# Patient Record
Sex: MALE | Race: WHITE | NOT HISPANIC OR LATINO | Employment: FULL TIME | URBAN - METROPOLITAN AREA
[De-identification: names, ages, dates, MRNs, and addresses within clinical notes are randomized per-mention and may not be internally consistent; named-entity substitution may affect disease eponyms.]

---

## 2017-01-26 ENCOUNTER — TRANSCRIBE ORDERS (OUTPATIENT)
Dept: ADMINISTRATIVE | Facility: HOSPITAL | Age: 52
End: 2017-01-26

## 2017-01-26 ENCOUNTER — LAB (OUTPATIENT)
Dept: LAB | Facility: HOSPITAL | Age: 52
End: 2017-01-26

## 2017-01-26 DIAGNOSIS — Z12.5 SPECIAL SCREENING FOR MALIGNANT NEOPLASM OF PROSTATE: ICD-10-CM

## 2017-01-26 DIAGNOSIS — Z00.00 ROUTINE GENERAL MEDICAL EXAMINATION AT A HEALTH CARE FACILITY: Primary | ICD-10-CM

## 2017-01-26 DIAGNOSIS — Z00.00 ROUTINE GENERAL MEDICAL EXAMINATION AT A HEALTH CARE FACILITY: ICD-10-CM

## 2017-01-26 LAB
ALBUMIN SERPL-MCNC: 4.2 G/DL (ref 3.5–5.2)
ALBUMIN/GLOB SERPL: 1.6 G/DL
ALP SERPL-CCNC: 58 U/L (ref 39–117)
ALT SERPL W P-5'-P-CCNC: 20 U/L (ref 1–41)
ANION GAP SERPL CALCULATED.3IONS-SCNC: 12.6 MMOL/L
AST SERPL-CCNC: 24 U/L (ref 1–40)
BASOPHILS # BLD AUTO: 0.06 10*3/MM3 (ref 0–0.2)
BASOPHILS NFR BLD AUTO: 1.2 % (ref 0–1.5)
BILIRUB SERPL-MCNC: 1.2 MG/DL (ref 0.1–1.2)
BILIRUB UR QL STRIP: NEGATIVE
BUN BLD-MCNC: 15 MG/DL (ref 6–20)
BUN/CREAT SERPL: 15.3 (ref 7–25)
CALCIUM SPEC-SCNC: 9.5 MG/DL (ref 8.6–10.5)
CHLORIDE SERPL-SCNC: 104 MMOL/L (ref 98–107)
CHOLEST SERPL-MCNC: 177 MG/DL (ref 0–200)
CLARITY UR: CLEAR
CO2 SERPL-SCNC: 25.4 MMOL/L (ref 22–29)
COLOR UR: YELLOW
CREAT BLD-MCNC: 0.98 MG/DL (ref 0.76–1.27)
DEPRECATED RDW RBC AUTO: 47.1 FL (ref 37–54)
EOSINOPHIL # BLD AUTO: 0.39 10*3/MM3 (ref 0–0.7)
EOSINOPHIL NFR BLD AUTO: 7.6 % (ref 0.3–6.2)
ERYTHROCYTE [DISTWIDTH] IN BLOOD BY AUTOMATED COUNT: 13.3 % (ref 11.5–14.5)
GFR SERPL CREATININE-BSD FRML MDRD: 80 ML/MIN/1.73
GLOBULIN UR ELPH-MCNC: 2.6 GM/DL
GLUCOSE BLD-MCNC: 101 MG/DL (ref 65–99)
GLUCOSE UR STRIP-MCNC: NEGATIVE MG/DL
HCT VFR BLD AUTO: 48.3 % (ref 40.4–52.2)
HDLC SERPL-MCNC: 56 MG/DL (ref 40–60)
HGB BLD-MCNC: 15.2 G/DL (ref 13.7–17.6)
HGB UR QL STRIP.AUTO: NEGATIVE
IMM GRANULOCYTES # BLD: 0 10*3/MM3 (ref 0–0.03)
IMM GRANULOCYTES NFR BLD: 0 % (ref 0–0.5)
KETONES UR QL STRIP: ABNORMAL
LDLC SERPL CALC-MCNC: 100 MG/DL (ref 0–100)
LDLC/HDLC SERPL: 1.78 {RATIO}
LEUKOCYTE ESTERASE UR QL STRIP.AUTO: NEGATIVE
LYMPHOCYTES # BLD AUTO: 1.02 10*3/MM3 (ref 0.9–4.8)
LYMPHOCYTES NFR BLD AUTO: 20 % (ref 19.6–45.3)
MCH RBC QN AUTO: 30.5 PG (ref 27–32.7)
MCHC RBC AUTO-ENTMCNC: 31.5 G/DL (ref 32.6–36.4)
MCV RBC AUTO: 96.8 FL (ref 79.8–96.2)
MONOCYTES # BLD AUTO: 0.71 10*3/MM3 (ref 0.2–1.2)
MONOCYTES NFR BLD AUTO: 13.9 % (ref 5–12)
NEUTROPHILS # BLD AUTO: 2.93 10*3/MM3 (ref 1.9–8.1)
NEUTROPHILS NFR BLD AUTO: 57.3 % (ref 42.7–76)
NITRITE UR QL STRIP: NEGATIVE
PH UR STRIP.AUTO: 6 [PH] (ref 5–8)
PLATELET # BLD AUTO: 240 10*3/MM3 (ref 140–500)
PMV BLD AUTO: 11.5 FL (ref 6–12)
POTASSIUM BLD-SCNC: 4.4 MMOL/L (ref 3.5–5.2)
PROT SERPL-MCNC: 6.8 G/DL (ref 6–8.5)
PROT UR QL STRIP: NEGATIVE
PSA SERPL-MCNC: 0.87 NG/ML (ref 0–4)
RBC # BLD AUTO: 4.99 10*6/MM3 (ref 4.6–6)
SODIUM BLD-SCNC: 142 MMOL/L (ref 136–145)
SP GR UR STRIP: 1.02 (ref 1–1.03)
TRIGL SERPL-MCNC: 106 MG/DL (ref 0–150)
UROBILINOGEN UR QL STRIP: ABNORMAL
VLDLC SERPL-MCNC: 21.2 MG/DL (ref 5–40)
WBC NRBC COR # BLD: 5.11 10*3/MM3 (ref 4.5–10.7)

## 2017-01-26 PROCEDURE — 81003 URINALYSIS AUTO W/O SCOPE: CPT | Performed by: INTERNAL MEDICINE

## 2017-01-26 PROCEDURE — 85025 COMPLETE CBC W/AUTO DIFF WBC: CPT | Performed by: INTERNAL MEDICINE

## 2017-01-26 PROCEDURE — 80053 COMPREHEN METABOLIC PANEL: CPT | Performed by: INTERNAL MEDICINE

## 2017-01-26 PROCEDURE — 80061 LIPID PANEL: CPT | Performed by: INTERNAL MEDICINE

## 2017-01-26 PROCEDURE — G0103 PSA SCREENING: HCPCS | Performed by: INTERNAL MEDICINE

## 2017-01-26 PROCEDURE — 36415 COLL VENOUS BLD VENIPUNCTURE: CPT

## 2018-02-05 ENCOUNTER — TRANSCRIBE ORDERS (OUTPATIENT)
Dept: ADMINISTRATIVE | Facility: HOSPITAL | Age: 53
End: 2018-02-05

## 2018-02-05 ENCOUNTER — LAB (OUTPATIENT)
Dept: LAB | Facility: HOSPITAL | Age: 53
End: 2018-02-05

## 2018-02-05 DIAGNOSIS — Z00.00 ROUTINE GENERAL MEDICAL EXAMINATION AT A HEALTH CARE FACILITY: ICD-10-CM

## 2018-02-05 DIAGNOSIS — Z12.5 SPECIAL SCREENING FOR MALIGNANT NEOPLASM OF PROSTATE: Primary | ICD-10-CM

## 2018-02-05 DIAGNOSIS — Z12.5 SPECIAL SCREENING FOR MALIGNANT NEOPLASM OF PROSTATE: ICD-10-CM

## 2018-02-05 LAB
ALBUMIN SERPL-MCNC: 4 G/DL (ref 3.5–5.2)
ALBUMIN/GLOB SERPL: 1.3 G/DL
ALP SERPL-CCNC: 59 U/L (ref 39–117)
ALT SERPL W P-5'-P-CCNC: 20 U/L (ref 1–41)
ANION GAP SERPL CALCULATED.3IONS-SCNC: 9.2 MMOL/L
AST SERPL-CCNC: 21 U/L (ref 1–40)
BASOPHILS # BLD AUTO: 0.04 10*3/MM3 (ref 0–0.2)
BASOPHILS NFR BLD AUTO: 0.8 % (ref 0–1.5)
BILIRUB SERPL-MCNC: 0.7 MG/DL (ref 0.1–1.2)
BILIRUB UR QL STRIP: NEGATIVE
BUN BLD-MCNC: 23 MG/DL (ref 6–20)
BUN/CREAT SERPL: 23.2 (ref 7–25)
CALCIUM SPEC-SCNC: 9.7 MG/DL (ref 8.6–10.5)
CHLORIDE SERPL-SCNC: 105 MMOL/L (ref 98–107)
CHOLEST SERPL-MCNC: 209 MG/DL (ref 0–200)
CLARITY UR: CLEAR
CO2 SERPL-SCNC: 27.8 MMOL/L (ref 22–29)
COLOR UR: YELLOW
CREAT BLD-MCNC: 0.99 MG/DL (ref 0.76–1.27)
DEPRECATED RDW RBC AUTO: 43.4 FL (ref 37–54)
EOSINOPHIL # BLD AUTO: 0.39 10*3/MM3 (ref 0–0.7)
EOSINOPHIL NFR BLD AUTO: 7.5 % (ref 0.3–6.2)
ERYTHROCYTE [DISTWIDTH] IN BLOOD BY AUTOMATED COUNT: 12.7 % (ref 11.5–14.5)
GFR SERPL CREATININE-BSD FRML MDRD: 79 ML/MIN/1.73
GLOBULIN UR ELPH-MCNC: 3 GM/DL
GLUCOSE BLD-MCNC: 97 MG/DL (ref 65–99)
GLUCOSE UR STRIP-MCNC: NEGATIVE MG/DL
HCT VFR BLD AUTO: 47.8 % (ref 40.4–52.2)
HDLC SERPL-MCNC: 64 MG/DL (ref 40–60)
HGB BLD-MCNC: 15.5 G/DL (ref 13.7–17.6)
HGB UR QL STRIP.AUTO: NEGATIVE
IMM GRANULOCYTES # BLD: 0 10*3/MM3 (ref 0–0.03)
IMM GRANULOCYTES NFR BLD: 0 % (ref 0–0.5)
KETONES UR QL STRIP: NEGATIVE
LDLC SERPL CALC-MCNC: 125 MG/DL (ref 0–100)
LDLC/HDLC SERPL: 1.95 {RATIO}
LEUKOCYTE ESTERASE UR QL STRIP.AUTO: NEGATIVE
LYMPHOCYTES # BLD AUTO: 1.09 10*3/MM3 (ref 0.9–4.8)
LYMPHOCYTES NFR BLD AUTO: 20.9 % (ref 19.6–45.3)
MCH RBC QN AUTO: 30.8 PG (ref 27–32.7)
MCHC RBC AUTO-ENTMCNC: 32.4 G/DL (ref 32.6–36.4)
MCV RBC AUTO: 95 FL (ref 79.8–96.2)
MONOCYTES # BLD AUTO: 0.73 10*3/MM3 (ref 0.2–1.2)
MONOCYTES NFR BLD AUTO: 14 % (ref 5–12)
NEUTROPHILS # BLD AUTO: 2.97 10*3/MM3 (ref 1.9–8.1)
NEUTROPHILS NFR BLD AUTO: 56.8 % (ref 42.7–76)
NITRITE UR QL STRIP: NEGATIVE
PH UR STRIP.AUTO: 6 [PH] (ref 5–8)
PLATELET # BLD AUTO: 249 10*3/MM3 (ref 140–500)
PMV BLD AUTO: 11.3 FL (ref 6–12)
POTASSIUM BLD-SCNC: 4.8 MMOL/L (ref 3.5–5.2)
PROT SERPL-MCNC: 7 G/DL (ref 6–8.5)
PROT UR QL STRIP: NEGATIVE
PSA SERPL-MCNC: 1.09 NG/ML (ref 0–4)
RBC # BLD AUTO: 5.03 10*6/MM3 (ref 4.6–6)
SODIUM BLD-SCNC: 142 MMOL/L (ref 136–145)
SP GR UR STRIP: 1.02 (ref 1–1.03)
TRIGL SERPL-MCNC: 101 MG/DL (ref 0–150)
UROBILINOGEN UR QL STRIP: NORMAL
VLDLC SERPL-MCNC: 20.2 MG/DL (ref 5–40)
WBC NRBC COR # BLD: 5.22 10*3/MM3 (ref 4.5–10.7)

## 2018-02-05 PROCEDURE — 80053 COMPREHEN METABOLIC PANEL: CPT | Performed by: INTERNAL MEDICINE

## 2018-02-05 PROCEDURE — 85025 COMPLETE CBC W/AUTO DIFF WBC: CPT | Performed by: INTERNAL MEDICINE

## 2018-02-05 PROCEDURE — 80061 LIPID PANEL: CPT | Performed by: INTERNAL MEDICINE

## 2018-02-05 PROCEDURE — 36415 COLL VENOUS BLD VENIPUNCTURE: CPT

## 2018-02-05 PROCEDURE — 81003 URINALYSIS AUTO W/O SCOPE: CPT | Performed by: INTERNAL MEDICINE

## 2018-02-05 PROCEDURE — G0103 PSA SCREENING: HCPCS | Performed by: INTERNAL MEDICINE

## 2018-05-02 ENCOUNTER — LAB (OUTPATIENT)
Dept: LAB | Facility: HOSPITAL | Age: 53
End: 2018-05-02

## 2018-05-02 ENCOUNTER — TRANSCRIBE ORDERS (OUTPATIENT)
Dept: ADMINISTRATIVE | Facility: HOSPITAL | Age: 53
End: 2018-05-02

## 2018-05-02 DIAGNOSIS — Z20.5 EXPOSURE TO HEPATITIS: Primary | ICD-10-CM

## 2018-05-02 DIAGNOSIS — Z20.5 EXPOSURE TO HEPATITIS: ICD-10-CM

## 2018-05-02 PROCEDURE — 86708 HEPATITIS A ANTIBODY: CPT | Performed by: INTERNAL MEDICINE

## 2018-05-02 PROCEDURE — 36415 COLL VENOUS BLD VENIPUNCTURE: CPT

## 2018-05-03 LAB — HAV AB SER QL IA: NEGATIVE

## 2019-02-20 ENCOUNTER — LAB (OUTPATIENT)
Dept: LAB | Facility: HOSPITAL | Age: 54
End: 2019-02-20

## 2019-02-20 ENCOUNTER — TRANSCRIBE ORDERS (OUTPATIENT)
Dept: ADMINISTRATIVE | Facility: HOSPITAL | Age: 54
End: 2019-02-20

## 2019-02-20 DIAGNOSIS — Z00.00 ROUTINE GENERAL MEDICAL EXAMINATION AT A HEALTH CARE FACILITY: ICD-10-CM

## 2019-02-20 DIAGNOSIS — Z12.5 SPECIAL SCREENING FOR MALIGNANT NEOPLASM OF PROSTATE: ICD-10-CM

## 2019-02-20 DIAGNOSIS — Z00.00 ROUTINE GENERAL MEDICAL EXAMINATION AT A HEALTH CARE FACILITY: Primary | ICD-10-CM

## 2019-02-20 LAB
ALBUMIN SERPL-MCNC: 4.1 G/DL (ref 3.5–5.2)
ALBUMIN/GLOB SERPL: 1.5 G/DL
ALP SERPL-CCNC: 62 U/L (ref 39–117)
ALT SERPL W P-5'-P-CCNC: 26 U/L (ref 1–41)
ANION GAP SERPL CALCULATED.3IONS-SCNC: 12.2 MMOL/L
AST SERPL-CCNC: 21 U/L (ref 1–40)
BASOPHILS # BLD AUTO: 0.05 10*3/MM3 (ref 0–0.2)
BASOPHILS NFR BLD AUTO: 0.8 % (ref 0–1.5)
BILIRUB SERPL-MCNC: 0.8 MG/DL (ref 0.1–1.2)
BILIRUB UR QL STRIP: NEGATIVE
BUN BLD-MCNC: 16 MG/DL (ref 6–20)
BUN/CREAT SERPL: 16.7 (ref 7–25)
CALCIUM SPEC-SCNC: 9.5 MG/DL (ref 8.6–10.5)
CHLORIDE SERPL-SCNC: 105 MMOL/L (ref 98–107)
CHOLEST SERPL-MCNC: 214 MG/DL (ref 0–200)
CLARITY UR: CLEAR
CO2 SERPL-SCNC: 26.8 MMOL/L (ref 22–29)
COLOR UR: YELLOW
CREAT BLD-MCNC: 0.96 MG/DL (ref 0.76–1.27)
DEPRECATED RDW RBC AUTO: 44 FL (ref 37–54)
EOSINOPHIL # BLD AUTO: 0.39 10*3/MM3 (ref 0–0.4)
EOSINOPHIL NFR BLD AUTO: 6.5 % (ref 0.3–6.2)
ERYTHROCYTE [DISTWIDTH] IN BLOOD BY AUTOMATED COUNT: 12.7 % (ref 12.3–15.4)
GFR SERPL CREATININE-BSD FRML MDRD: 82 ML/MIN/1.73
GLOBULIN UR ELPH-MCNC: 2.7 GM/DL
GLUCOSE BLD-MCNC: 95 MG/DL (ref 65–99)
GLUCOSE UR STRIP-MCNC: NEGATIVE MG/DL
HCT VFR BLD AUTO: 51.2 % (ref 37.5–51)
HDLC SERPL-MCNC: 62 MG/DL (ref 40–60)
HGB BLD-MCNC: 16.4 G/DL (ref 13–17.7)
HGB UR QL STRIP.AUTO: NEGATIVE
IMM GRANULOCYTES # BLD AUTO: 0.02 10*3/MM3 (ref 0–0.05)
IMM GRANULOCYTES NFR BLD AUTO: 0.3 % (ref 0–0.5)
KETONES UR QL STRIP: NEGATIVE
LDLC SERPL CALC-MCNC: 133 MG/DL (ref 0–100)
LDLC/HDLC SERPL: 2.15 {RATIO}
LEUKOCYTE ESTERASE UR QL STRIP.AUTO: NEGATIVE
LYMPHOCYTES # BLD AUTO: 1.06 10*3/MM3 (ref 0.7–3.1)
LYMPHOCYTES NFR BLD AUTO: 17.7 % (ref 19.6–45.3)
MCH RBC QN AUTO: 30.3 PG (ref 26.6–33)
MCHC RBC AUTO-ENTMCNC: 32 G/DL (ref 31.5–35.7)
MCV RBC AUTO: 94.6 FL (ref 79–97)
MONOCYTES # BLD AUTO: 0.75 10*3/MM3 (ref 0.1–0.9)
MONOCYTES NFR BLD AUTO: 12.5 % (ref 5–12)
NEUTROPHILS # BLD AUTO: 3.72 10*3/MM3 (ref 1.4–7)
NEUTROPHILS NFR BLD AUTO: 62.2 % (ref 42.7–76)
NITRITE UR QL STRIP: NEGATIVE
NRBC BLD AUTO-RTO: 0 /100 WBC (ref 0–0)
PH UR STRIP.AUTO: 5.5 [PH] (ref 5–8)
PLATELET # BLD AUTO: 269 10*3/MM3 (ref 140–450)
PMV BLD AUTO: 11.2 FL (ref 6–12)
POTASSIUM BLD-SCNC: 4.7 MMOL/L (ref 3.5–5.2)
PROT SERPL-MCNC: 6.8 G/DL (ref 6–8.5)
PROT UR QL STRIP: NEGATIVE
PSA SERPL-MCNC: 1.12 NG/ML (ref 0–4)
RBC # BLD AUTO: 5.41 10*6/MM3 (ref 4.14–5.8)
SODIUM BLD-SCNC: 144 MMOL/L (ref 136–145)
SP GR UR STRIP: 1.02 (ref 1–1.03)
TRIGL SERPL-MCNC: 94 MG/DL (ref 0–150)
UROBILINOGEN UR QL STRIP: NORMAL
VLDLC SERPL-MCNC: 18.8 MG/DL (ref 5–40)
WBC NRBC COR # BLD: 5.99 10*3/MM3 (ref 3.4–10.8)

## 2019-02-20 PROCEDURE — 80061 LIPID PANEL: CPT | Performed by: INTERNAL MEDICINE

## 2019-02-20 PROCEDURE — 80053 COMPREHEN METABOLIC PANEL: CPT | Performed by: INTERNAL MEDICINE

## 2019-02-20 PROCEDURE — 85025 COMPLETE CBC W/AUTO DIFF WBC: CPT | Performed by: INTERNAL MEDICINE

## 2019-02-20 PROCEDURE — 36415 COLL VENOUS BLD VENIPUNCTURE: CPT

## 2019-02-20 PROCEDURE — 81003 URINALYSIS AUTO W/O SCOPE: CPT | Performed by: INTERNAL MEDICINE

## 2019-02-20 PROCEDURE — G0103 PSA SCREENING: HCPCS | Performed by: INTERNAL MEDICINE

## 2019-04-24 ENCOUNTER — OFFICE VISIT (OUTPATIENT)
Dept: SLEEP MEDICINE | Facility: HOSPITAL | Age: 54
End: 2019-04-24

## 2019-04-24 VITALS
OXYGEN SATURATION: 94 % | WEIGHT: 261.6 LBS | SYSTOLIC BLOOD PRESSURE: 122 MMHG | HEIGHT: 72 IN | DIASTOLIC BLOOD PRESSURE: 82 MMHG | HEART RATE: 74 BPM | BODY MASS INDEX: 35.43 KG/M2

## 2019-04-24 DIAGNOSIS — G47.33 OSA (OBSTRUCTIVE SLEEP APNEA): Primary | ICD-10-CM

## 2019-04-24 PROCEDURE — G0463 HOSPITAL OUTPT CLINIC VISIT: HCPCS

## 2019-04-24 PROCEDURE — 99244 OFF/OP CNSLTJ NEW/EST MOD 40: CPT | Performed by: INTERNAL MEDICINE

## 2019-04-26 ENCOUNTER — HOSPITAL ENCOUNTER (OUTPATIENT)
Dept: SLEEP MEDICINE | Facility: HOSPITAL | Age: 54
Discharge: HOME | End: 2019-04-26
Admitting: INTERNAL MEDICINE

## 2019-04-26 DIAGNOSIS — G47.33 OSA (OBSTRUCTIVE SLEEP APNEA): ICD-10-CM

## 2019-04-26 PROCEDURE — 95806 SLEEP STUDY UNATT&RESP EFFT: CPT

## 2019-04-26 PROCEDURE — 95806 SLEEP STUDY UNATT&RESP EFFT: CPT | Performed by: INTERNAL MEDICINE

## 2019-05-10 ENCOUNTER — TELEPHONE (OUTPATIENT)
Dept: SLEEP MEDICINE | Facility: HOSPITAL | Age: 54
End: 2019-05-10

## 2019-05-10 NOTE — TELEPHONE ENCOUNTER
Tech called pts mobile #, no answer. Left vm informing pt of positive study results. Informed sent setup to [facility]ME [facility] and informed of scheduled F/U appt. Mailed results letter. MAB

## 2019-07-31 ENCOUNTER — APPOINTMENT (OUTPATIENT)
Dept: SLEEP MEDICINE | Facility: HOSPITAL | Age: 54
End: 2019-07-31

## 2020-02-21 ENCOUNTER — LAB (OUTPATIENT)
Dept: LAB | Facility: HOSPITAL | Age: 55
End: 2020-02-21

## 2020-02-21 ENCOUNTER — TRANSCRIBE ORDERS (OUTPATIENT)
Dept: ADMINISTRATIVE | Facility: HOSPITAL | Age: 55
End: 2020-02-21

## 2020-02-21 DIAGNOSIS — Z12.5 SPECIAL SCREENING FOR MALIGNANT NEOPLASM OF PROSTATE: ICD-10-CM

## 2020-02-21 DIAGNOSIS — Z00.00 ROUTINE GENERAL MEDICAL EXAMINATION AT A HEALTH CARE FACILITY: ICD-10-CM

## 2020-02-21 DIAGNOSIS — Z00.00 ROUTINE GENERAL MEDICAL EXAMINATION AT A HEALTH CARE FACILITY: Primary | ICD-10-CM

## 2020-02-21 LAB
ALBUMIN SERPL-MCNC: 3.9 G/DL (ref 3.5–5.2)
ALBUMIN/GLOB SERPL: 1.6 G/DL
ALP SERPL-CCNC: 65 U/L (ref 39–117)
ALT SERPL W P-5'-P-CCNC: 21 U/L (ref 1–41)
ANION GAP SERPL CALCULATED.3IONS-SCNC: 9.3 MMOL/L (ref 5–15)
AST SERPL-CCNC: 23 U/L (ref 1–40)
BASOPHILS # BLD AUTO: 0.05 10*3/MM3 (ref 0–0.2)
BASOPHILS NFR BLD AUTO: 0.9 % (ref 0–1.5)
BILIRUB SERPL-MCNC: 0.7 MG/DL (ref 0.2–1.2)
BILIRUB UR QL STRIP: NEGATIVE
BUN BLD-MCNC: 19 MG/DL (ref 6–20)
BUN/CREAT SERPL: 26.8 (ref 7–25)
CALCIUM SPEC-SCNC: 9.1 MG/DL (ref 8.6–10.5)
CHLORIDE SERPL-SCNC: 103 MMOL/L (ref 98–107)
CHOLEST SERPL-MCNC: 212 MG/DL (ref 0–200)
CLARITY UR: CLEAR
CO2 SERPL-SCNC: 26.7 MMOL/L (ref 22–29)
COLOR UR: YELLOW
CREAT BLD-MCNC: 0.71 MG/DL (ref 0.76–1.27)
DEPRECATED RDW RBC AUTO: 43.3 FL (ref 37–54)
EOSINOPHIL # BLD AUTO: 0.54 10*3/MM3 (ref 0–0.4)
EOSINOPHIL NFR BLD AUTO: 9.3 % (ref 0.3–6.2)
ERYTHROCYTE [DISTWIDTH] IN BLOOD BY AUTOMATED COUNT: 12.5 % (ref 12.3–15.4)
GFR SERPL CREATININE-BSD FRML MDRD: 115 ML/MIN/1.73
GLOBULIN UR ELPH-MCNC: 2.4 GM/DL
GLUCOSE BLD-MCNC: 111 MG/DL (ref 65–99)
GLUCOSE UR STRIP-MCNC: NEGATIVE MG/DL
HCT VFR BLD AUTO: 47.1 % (ref 37.5–51)
HDLC SERPL-MCNC: 55 MG/DL (ref 40–60)
HGB BLD-MCNC: 15.5 G/DL (ref 13–17.7)
HGB UR QL STRIP.AUTO: NEGATIVE
IMM GRANULOCYTES # BLD AUTO: 0.02 10*3/MM3 (ref 0–0.05)
IMM GRANULOCYTES NFR BLD AUTO: 0.3 % (ref 0–0.5)
KETONES UR QL STRIP: NEGATIVE
LDLC SERPL CALC-MCNC: 133 MG/DL (ref 0–100)
LDLC/HDLC SERPL: 2.42 {RATIO}
LEUKOCYTE ESTERASE UR QL STRIP.AUTO: NEGATIVE
LYMPHOCYTES # BLD AUTO: 1 10*3/MM3 (ref 0.7–3.1)
LYMPHOCYTES NFR BLD AUTO: 17.3 % (ref 19.6–45.3)
MCH RBC QN AUTO: 30.4 PG (ref 26.6–33)
MCHC RBC AUTO-ENTMCNC: 32.9 G/DL (ref 31.5–35.7)
MCV RBC AUTO: 92.4 FL (ref 79–97)
MONOCYTES # BLD AUTO: 0.81 10*3/MM3 (ref 0.1–0.9)
MONOCYTES NFR BLD AUTO: 14 % (ref 5–12)
NEUTROPHILS # BLD AUTO: 3.36 10*3/MM3 (ref 1.7–7)
NEUTROPHILS NFR BLD AUTO: 58.2 % (ref 42.7–76)
NITRITE UR QL STRIP: NEGATIVE
NRBC BLD AUTO-RTO: 0 /100 WBC (ref 0–0.2)
PH UR STRIP.AUTO: 6.5 [PH] (ref 5–8)
PLATELET # BLD AUTO: 254 10*3/MM3 (ref 140–450)
PMV BLD AUTO: 11.2 FL (ref 6–12)
POTASSIUM BLD-SCNC: 4.3 MMOL/L (ref 3.5–5.2)
PROT SERPL-MCNC: 6.3 G/DL (ref 6–8.5)
PROT UR QL STRIP: NEGATIVE
PSA SERPL-MCNC: 1.11 NG/ML (ref 0–4)
RBC # BLD AUTO: 5.1 10*6/MM3 (ref 4.14–5.8)
SODIUM BLD-SCNC: 139 MMOL/L (ref 136–145)
SP GR UR STRIP: 1.02 (ref 1–1.03)
TRIGL SERPL-MCNC: 120 MG/DL (ref 0–150)
UROBILINOGEN UR QL STRIP: NORMAL
VLDLC SERPL-MCNC: 24 MG/DL (ref 5–40)
WBC NRBC COR # BLD: 5.78 10*3/MM3 (ref 3.4–10.8)

## 2020-02-21 PROCEDURE — 81003 URINALYSIS AUTO W/O SCOPE: CPT | Performed by: INTERNAL MEDICINE

## 2020-02-21 PROCEDURE — 85025 COMPLETE CBC W/AUTO DIFF WBC: CPT | Performed by: INTERNAL MEDICINE

## 2020-02-21 PROCEDURE — 36415 COLL VENOUS BLD VENIPUNCTURE: CPT

## 2020-02-21 PROCEDURE — 80053 COMPREHEN METABOLIC PANEL: CPT | Performed by: INTERNAL MEDICINE

## 2020-02-21 PROCEDURE — G0103 PSA SCREENING: HCPCS | Performed by: INTERNAL MEDICINE

## 2020-02-21 PROCEDURE — 80061 LIPID PANEL: CPT | Performed by: INTERNAL MEDICINE

## 2021-02-25 ENCOUNTER — TRANSCRIBE ORDERS (OUTPATIENT)
Dept: ADMINISTRATIVE | Facility: HOSPITAL | Age: 56
End: 2021-02-25

## 2021-02-25 ENCOUNTER — LAB (OUTPATIENT)
Dept: LAB | Facility: HOSPITAL | Age: 56
End: 2021-02-25

## 2021-02-25 DIAGNOSIS — Z20.828 EXPOSURE TO SARS-ASSOCIATED CORONAVIRUS: ICD-10-CM

## 2021-02-25 DIAGNOSIS — Z12.5 SPECIAL SCREENING FOR MALIGNANT NEOPLASM OF PROSTATE: ICD-10-CM

## 2021-02-25 DIAGNOSIS — Z00.00 ROUTINE GENERAL MEDICAL EXAMINATION AT A HEALTH CARE FACILITY: ICD-10-CM

## 2021-02-25 DIAGNOSIS — Z00.00 ROUTINE GENERAL MEDICAL EXAMINATION AT A HEALTH CARE FACILITY: Primary | ICD-10-CM

## 2021-02-25 LAB
ALBUMIN SERPL-MCNC: 4.3 G/DL (ref 3.5–5.2)
ALBUMIN/GLOB SERPL: 1.7 G/DL
ALP SERPL-CCNC: 72 U/L (ref 39–117)
ALT SERPL W P-5'-P-CCNC: 28 U/L (ref 1–41)
ANION GAP SERPL CALCULATED.3IONS-SCNC: 8.8 MMOL/L (ref 5–15)
AST SERPL-CCNC: 34 U/L (ref 1–40)
BASOPHILS # BLD AUTO: 0.06 10*3/MM3 (ref 0–0.2)
BASOPHILS NFR BLD AUTO: 1.1 % (ref 0–1.5)
BILIRUB SERPL-MCNC: 1.3 MG/DL (ref 0–1.2)
BILIRUB UR QL STRIP: NEGATIVE
BUN SERPL-MCNC: 15 MG/DL (ref 6–20)
BUN/CREAT SERPL: 20.5 (ref 7–25)
CALCIUM SPEC-SCNC: 8.9 MG/DL (ref 8.6–10.5)
CHLORIDE SERPL-SCNC: 101 MMOL/L (ref 98–107)
CHOLEST SERPL-MCNC: 210 MG/DL (ref 0–200)
CLARITY UR: CLEAR
CO2 SERPL-SCNC: 26.2 MMOL/L (ref 22–29)
COLOR UR: YELLOW
CREAT SERPL-MCNC: 0.73 MG/DL (ref 0.76–1.27)
DEPRECATED RDW RBC AUTO: 40 FL (ref 37–54)
EOSINOPHIL # BLD AUTO: 0.44 10*3/MM3 (ref 0–0.4)
EOSINOPHIL NFR BLD AUTO: 7.8 % (ref 0.3–6.2)
ERYTHROCYTE [DISTWIDTH] IN BLOOD BY AUTOMATED COUNT: 12.2 % (ref 12.3–15.4)
GFR SERPL CREATININE-BSD FRML MDRD: 111 ML/MIN/1.73
GLOBULIN UR ELPH-MCNC: 2.5 GM/DL
GLUCOSE SERPL-MCNC: 97 MG/DL (ref 65–99)
GLUCOSE UR STRIP-MCNC: NEGATIVE MG/DL
HCT VFR BLD AUTO: 47.7 % (ref 37.5–51)
HDLC SERPL-MCNC: 55 MG/DL (ref 40–60)
HGB BLD-MCNC: 16.7 G/DL (ref 13–17.7)
HGB UR QL STRIP.AUTO: NEGATIVE
IMM GRANULOCYTES # BLD AUTO: 0.01 10*3/MM3 (ref 0–0.05)
IMM GRANULOCYTES NFR BLD AUTO: 0.2 % (ref 0–0.5)
KETONES UR QL STRIP: ABNORMAL
LDLC SERPL CALC-MCNC: 139 MG/DL (ref 0–100)
LDLC/HDLC SERPL: 2.5 {RATIO}
LEUKOCYTE ESTERASE UR QL STRIP.AUTO: NEGATIVE
LYMPHOCYTES # BLD AUTO: 1.12 10*3/MM3 (ref 0.7–3.1)
LYMPHOCYTES NFR BLD AUTO: 19.8 % (ref 19.6–45.3)
MCH RBC QN AUTO: 31.5 PG (ref 26.6–33)
MCHC RBC AUTO-ENTMCNC: 35 G/DL (ref 31.5–35.7)
MCV RBC AUTO: 90 FL (ref 79–97)
MONOCYTES # BLD AUTO: 0.87 10*3/MM3 (ref 0.1–0.9)
MONOCYTES NFR BLD AUTO: 15.3 % (ref 5–12)
NEUTROPHILS NFR BLD AUTO: 3.17 10*3/MM3 (ref 1.7–7)
NEUTROPHILS NFR BLD AUTO: 55.8 % (ref 42.7–76)
NITRITE UR QL STRIP: NEGATIVE
NRBC BLD AUTO-RTO: 0 /100 WBC (ref 0–0.2)
PH UR STRIP.AUTO: 5.5 [PH] (ref 5–8)
PLATELET # BLD AUTO: 262 10*3/MM3 (ref 140–450)
PMV BLD AUTO: 11 FL (ref 6–12)
POTASSIUM SERPL-SCNC: 4.3 MMOL/L (ref 3.5–5.2)
PROT SERPL-MCNC: 6.8 G/DL (ref 6–8.5)
PROT UR QL STRIP: NEGATIVE
PSA SERPL-MCNC: 1 NG/ML (ref 0–4)
RBC # BLD AUTO: 5.3 10*6/MM3 (ref 4.14–5.8)
SODIUM SERPL-SCNC: 136 MMOL/L (ref 136–145)
SP GR UR STRIP: 1.01 (ref 1–1.03)
TRIGL SERPL-MCNC: 88 MG/DL (ref 0–150)
UROBILINOGEN UR QL STRIP: ABNORMAL
VLDLC SERPL-MCNC: 16 MG/DL (ref 5–40)
WBC # BLD AUTO: 5.67 10*3/MM3 (ref 3.4–10.8)

## 2021-02-25 PROCEDURE — 80061 LIPID PANEL: CPT

## 2021-02-25 PROCEDURE — G0103 PSA SCREENING: HCPCS

## 2021-02-25 PROCEDURE — 86769 SARS-COV-2 COVID-19 ANTIBODY: CPT

## 2021-02-25 PROCEDURE — 85025 COMPLETE CBC W/AUTO DIFF WBC: CPT

## 2021-02-25 PROCEDURE — 81003 URINALYSIS AUTO W/O SCOPE: CPT

## 2021-02-25 PROCEDURE — 80053 COMPREHEN METABOLIC PANEL: CPT

## 2021-02-25 PROCEDURE — 36415 COLL VENOUS BLD VENIPUNCTURE: CPT

## 2021-02-26 LAB — SARS-COV-2 AB SERPL QL IA: NEGATIVE

## 2021-05-25 ENCOUNTER — TRANSCRIBE ORDERS (OUTPATIENT)
Dept: ADMINISTRATIVE | Facility: HOSPITAL | Age: 56
End: 2021-05-25

## 2021-05-25 ENCOUNTER — LAB (OUTPATIENT)
Dept: LAB | Facility: HOSPITAL | Age: 56
End: 2021-05-25

## 2021-05-25 DIAGNOSIS — E78.5 HYPERLIPIDEMIA, UNSPECIFIED HYPERLIPIDEMIA TYPE: Primary | ICD-10-CM

## 2021-05-25 DIAGNOSIS — E78.5 HYPERLIPIDEMIA, UNSPECIFIED HYPERLIPIDEMIA TYPE: ICD-10-CM

## 2021-05-25 LAB
ALBUMIN SERPL-MCNC: 3.9 G/DL (ref 3.5–5.2)
ALBUMIN/GLOB SERPL: 1.8 G/DL
ALP SERPL-CCNC: 74 U/L (ref 39–117)
ALT SERPL W P-5'-P-CCNC: 21 U/L (ref 1–41)
ANION GAP SERPL CALCULATED.3IONS-SCNC: 8.8 MMOL/L (ref 5–15)
AST SERPL-CCNC: 21 U/L (ref 1–40)
BILIRUB SERPL-MCNC: 1 MG/DL (ref 0–1.2)
BUN SERPL-MCNC: 18 MG/DL (ref 6–20)
BUN/CREAT SERPL: 25.4 (ref 7–25)
CALCIUM SPEC-SCNC: 9 MG/DL (ref 8.6–10.5)
CHLORIDE SERPL-SCNC: 107 MMOL/L (ref 98–107)
CHOLEST SERPL-MCNC: 115 MG/DL (ref 0–200)
CK SERPL-CCNC: 149 U/L (ref 20–200)
CO2 SERPL-SCNC: 25.2 MMOL/L (ref 22–29)
CREAT SERPL-MCNC: 0.71 MG/DL (ref 0.76–1.27)
GFR SERPL CREATININE-BSD FRML MDRD: 115 ML/MIN/1.73
GLOBULIN UR ELPH-MCNC: 2.2 GM/DL
GLUCOSE SERPL-MCNC: 98 MG/DL (ref 65–99)
HDLC SERPL-MCNC: 55 MG/DL (ref 40–60)
LDLC SERPL CALC-MCNC: 46 MG/DL (ref 0–100)
LDLC/HDLC SERPL: 0.86 {RATIO}
POTASSIUM SERPL-SCNC: 4.4 MMOL/L (ref 3.5–5.2)
PROT SERPL-MCNC: 6.1 G/DL (ref 6–8.5)
SODIUM SERPL-SCNC: 141 MMOL/L (ref 136–145)
TRIGL SERPL-MCNC: 63 MG/DL (ref 0–150)
VLDLC SERPL-MCNC: 14 MG/DL (ref 5–40)

## 2021-05-25 PROCEDURE — 36415 COLL VENOUS BLD VENIPUNCTURE: CPT

## 2021-05-25 PROCEDURE — 82550 ASSAY OF CK (CPK): CPT

## 2021-05-25 PROCEDURE — 80061 LIPID PANEL: CPT

## 2021-05-25 PROCEDURE — 80053 COMPREHEN METABOLIC PANEL: CPT

## 2022-10-03 ENCOUNTER — OFFICE VISIT (OUTPATIENT)
Dept: SLEEP MEDICINE | Facility: HOSPITAL | Age: 57
End: 2022-10-03

## 2022-10-03 VITALS
BODY MASS INDEX: 36.84 KG/M2 | DIASTOLIC BLOOD PRESSURE: 81 MMHG | HEIGHT: 72 IN | WEIGHT: 272 LBS | SYSTOLIC BLOOD PRESSURE: 140 MMHG | OXYGEN SATURATION: 99 % | HEART RATE: 58 BPM

## 2022-10-03 DIAGNOSIS — E66.9 OBESITY (BMI 30-39.9): ICD-10-CM

## 2022-10-03 DIAGNOSIS — G47.33 OSA (OBSTRUCTIVE SLEEP APNEA): Primary | ICD-10-CM

## 2022-10-03 PROCEDURE — G0463 HOSPITAL OUTPT CLINIC VISIT: HCPCS

## 2022-10-03 NOTE — PROGRESS NOTES
"[facility]  Telephone: [phone] / Fax: [phone] [place]  Telephone: [phone] / Fax: [phone] [place]    Referring Physician: Jorge Jung MD  PCP: Jorge Jung MD    Reason for consult:  sleep apnea    Robin Engle is a 56 y.o.male  was seen in the Sleep Disorders Center today for evaluation of sleep apnea.  He has history of BEKA dating back to 2003 or 2004.   He had T&A and UPPP in the past that improved symptoms to some degree initially. He then had his sleep study repeated in 2019. Study showed moderate BEKA. He was prescribed CPAP. However, he was not ready to start CPAP at this time. Since then he gained 30 lbs. Snoring has increased. He returns for worsening snoring as it is disturbing to his wife. He reports abnormal leg movements/twitching for 30 min at night.    SH- , drinks 5 alc per week, 4-5 caffeine per day    ROS-negative.    Robin Engle  has a past medical history of BEKA (obstructive sleep apnea) (2003).    Current Medications:    Current Outpatient Medications:   •  Ped Multivitamins-Fl-Iron (MULTIVITAMIN WITH FLUORIDE/IRON) 0.25-10 MG/ML solution solution, Take  by mouth Daily., Disp: , Rfl:     I have reviewed Past Medical History, Past Surgical History, Medication List, Social History and Family History as entered in Sleep Questionnaire and EPIC.    ESS  4   Vital Signs /81   Pulse 58   Ht 182.9 cm (72.01\")   Wt 123 kg (272 lb)   SpO2 99%   BMI 36.88 kg/m²  Body mass index is 36.88 kg/m².    General Alert and oriented. No acute distress noted   Pharynx/Throat Class IV  Mallampati airway, large tongue, no evidence of redundant lateral pharyngeal tissue. No oral lesions. No thrush. Moist mucous membranes.   Head Normocephalic. Symmetrical. Atraumatic.    Nose No septal deviation. No drainage   Chest Wall Normal shape. Symmetric expansion with respiration. No tenderness.   Neck Trachea midline, no thyromegaly " or adenopathy    Lungs Clear to auscultation bilaterally. No wheezes. No rhonchi. No rales. Respirations regular, even and unlabored.   Heart Regular rhythm and normal rate. Normal S1 and S2. No murmur   Abdomen Soft, non-tender and non-distended. Normal bowel sounds. No masses.   Extremities Moves all extremities well. No edema   Psychiatric Normal mood and affect.        Impression:  1. BEKA (obstructive sleep apnea)    2. Obesity (BMI 30-39.9)          Plan:  Order CPAP machine based on 2019 study. I don't feel that repeat study is indicated at this time as he gained weight, has more snoring, and more sleep disturbances. I discussed CPAP, OMAD, and inspire treatment options. Plan is to order auto CPAP 5-20cm H2O based on 2019 study. If he notices intolerance, we can plan on doing in lab titration after he used the machine for about 30 days. I reviewed original sleep study report.      I appreciate the opportunity to participate in this patient's care.      GISELA Nicolas  [facility]  Phone: [phone]      Part of this note may be an electronic transcription/translation of spoken language to printed text using the Dragon Dictation System. Some errors may exist even though the document was edited.

## 2022-10-21 ENCOUNTER — OFFICE VISIT (OUTPATIENT)
Dept: SLEEP MEDICINE | Facility: HOSPITAL | Age: 57
End: 2022-10-21

## 2022-10-21 VITALS
HEIGHT: 72 IN | SYSTOLIC BLOOD PRESSURE: 126 MMHG | OXYGEN SATURATION: 97 % | BODY MASS INDEX: 35.49 KG/M2 | DIASTOLIC BLOOD PRESSURE: 81 MMHG | HEART RATE: 66 BPM | WEIGHT: 262 LBS

## 2022-10-21 DIAGNOSIS — G47.33 OSA (OBSTRUCTIVE SLEEP APNEA): Primary | ICD-10-CM

## 2022-10-21 PROCEDURE — G0463 HOSPITAL OUTPT CLINIC VISIT: HCPCS

## 2022-10-21 NOTE — PROGRESS NOTES
"[facility]  Telephone: [phone] / Fax: [phone] [place]  Telephone: [phone] / Fax: [phone] Karolina Reynolds    PCP: Jorge Jung MD    Reason for visit: BEKA f/u    Robin Engle is a 56 y.o.male  was last seen at [facility] sleep lab earlier this month. Insurance did not want to pay for new device based on 2019 study. He is here to arrange a new study. I reviewed prior history and clinical presentation. No change since 10/3/22.    He has history of BEKA dating back to 2003 or 2004.   He had T&A and UPPP in the past that improved symptoms to some degree initially. He then had his sleep study repeated in 2019. Study showed moderate BEKA. He was prescribed CPAP. However, he was not ready to start CPAP at that time. Since then he gained 30 lbs. Snoring has increased.  His sleep schedule is 11:30pm-7am. ESS is 10.    SH-no tobacco,  2-3 alc per week, 4-5 caffeine per day      Current Medications:    Current Outpatient Medications:   •  Ped Multivitamins-Fl-Iron (MULTIVITAMIN WITH FLUORIDE/IRON) 0.25-10 MG/ML solution solution, Take  by mouth Daily., Disp: , Rfl:    also entered in Sleep Questionnaire    Patient  has a past medical history of BEKA (obstructive sleep apnea) (2003).    I have reviewed the Past Medical History, Past Surgical History, Social History and Family History.    Vital Signs /81   Pulse 66   Ht 182.9 cm (72.01\")   Wt 119 kg (262 lb)   SpO2 97%   BMI 35.52 kg/m²  Body mass index is 35.52 kg/m².    General Alert and oriented. No acute distress noted   Pharynx/Throat Class IV  Mallampati airway, large tongue, no evidence of redundant lateral pharyngeal tissue. No oral lesions. No thrush. Moist mucous membranes.   Head Normocephalic. Symmetrical. Atraumatic.    Nose No septal deviation. No drainage   Chest Wall Normal shape. Symmetric expansion with respiration. No tenderness.   Neck Trachea midline, no thyromegaly or adenopathy    Lungs Clear to " auscultation bilaterally. No wheezes. No rhonchi. No rales. Respirations regular, even and unlabored.   Heart Regular rhythm and normal rate. Normal S1 and S2. No murmur   Abdomen Soft, non-tender and non-distended. Normal bowel sounds. No masses.   Extremities Moves all extremities well. No edema   Psychiatric Normal mood and affect.       HST 2019-Home Sleep Study Findings:   Recording start time 11:39 PM and recording end time 7:47 AM. Total recording time 488 minutes and monitoring time 456 minutes.  The patient had 28 obstructive events and 133 partial obstructive events. AHI for total sleep time is moderately abnormal at 21 events per hour. Lowest oxygen saturation is 76% and sleep-related hypoxia present for 30 minutes.    Impression:  1. BEKA (obstructive sleep apnea)          Plan:  Schedule updated HST to re-evaluate BEKA severity in view of weight gain and snoring.  Insurance did not approve coverage for a CPAP based on 2019 sleep study we have on file. Repeat study is therefore being ordered.  CPAP will be ordered after updated study as he is now more agreeable to treat sleep apnea.      Thank you for allowing me to participate in your patient's care.      GISELA Nicolas  [facility]  Phone: [phone]      Part of this note may be an electronic transcription/translation of spoken language to printed text using the Dragon Dictation System.

## 2022-11-03 ENCOUNTER — APPOINTMENT (OUTPATIENT)
Dept: SLEEP MEDICINE | Facility: HOSPITAL | Age: 57
End: 2022-11-03

## 2022-12-05 ENCOUNTER — HOSPITAL ENCOUNTER (OUTPATIENT)
Dept: SLEEP MEDICINE | Facility: HOSPITAL | Age: 57
Discharge: HOME | End: 2022-12-05
Admitting: NURSE PRACTITIONER

## 2022-12-05 DIAGNOSIS — G47.33 OSA (OBSTRUCTIVE SLEEP APNEA): ICD-10-CM

## 2022-12-05 PROCEDURE — 95806 SLEEP STUDY UNATT&RESP EFFT: CPT

## 2022-12-08 ENCOUNTER — TELEPHONE (OUTPATIENT)
Dept: SLEEP MEDICINE | Facility: HOSPITAL | Age: 57
End: 2022-12-08

## 2022-12-08 ENCOUNTER — APPOINTMENT (OUTPATIENT)
Dept: SLEEP MEDICINE | Facility: HOSPITAL | Age: 57
End: 2022-12-08

## 2022-12-12 ENCOUNTER — APPOINTMENT (OUTPATIENT)
Dept: SLEEP MEDICINE | Facility: HOSPITAL | Age: 57
End: 2022-12-12

## 2023-01-23 ENCOUNTER — OFFICE VISIT (OUTPATIENT)
Dept: SLEEP MEDICINE | Facility: HOSPITAL | Age: 58
End: 2023-01-23
Payer: COMMERCIAL

## 2023-01-23 VITALS
WEIGHT: 263 LBS | SYSTOLIC BLOOD PRESSURE: 123 MMHG | BODY MASS INDEX: 35.62 KG/M2 | DIASTOLIC BLOOD PRESSURE: 74 MMHG | HEIGHT: 72 IN | OXYGEN SATURATION: 98 % | HEART RATE: 54 BPM

## 2023-01-23 DIAGNOSIS — E66.9 OBESITY (BMI 30-39.9): ICD-10-CM

## 2023-01-23 DIAGNOSIS — G47.33 OSA (OBSTRUCTIVE SLEEP APNEA): Primary | ICD-10-CM

## 2023-01-23 DIAGNOSIS — J93.82 AIR LEAK: ICD-10-CM

## 2023-01-23 DIAGNOSIS — G47.34 NOCTURNAL HYPOXIA: ICD-10-CM

## 2023-01-23 PROCEDURE — G0463 HOSPITAL OUTPT CLINIC VISIT: HCPCS

## 2023-01-23 NOTE — PROGRESS NOTES
"[facility]  Telephone: [phone] / Fax: [phone] [place]  Telephone: [phone] / Fax: [phone] Karolina Reynolds    PCP: Jorge Jung MD    Reason for visit: BEKA f/u    Robin Engle is a 57 y.o.male  was last seen at [facility] sleep lab in October 2022. He had updated sleep study to qualify for a machine in early December 2022 and was started on CPAP machine shortly later. His study showed severe BEKA with AHI 32/hr and supine AHI 64/hr with significant sleep related hypoxia. He received his CPAP from [facility] and is here today to review his progress on the machine. He wakes up at 5-6am. Pressures are so strong. His mask does not stay in place. Air is possibly escaping out of the mask. He has to turn the machine off and back on to reset the pressures.  He had T&A and UPPP many years ago.    SH- 4-5 caffeine per day, no tobacco    ROS- negative.    Current Medications:    Current Outpatient Medications:   •  Ped Multivitamins-Fl-Iron (MULTIVITAMIN WITH FLUORIDE/IRON) 0.25-10 MG/ML solution solution, Take  by mouth Daily., Disp: , Rfl:    also entered in Sleep Questionnaire    Patient  has a past medical history of BEKA (obstructive sleep apnea) (2003).    I have reviewed the Past Medical History, Past Surgical History, Social History and Family History.    Vital Signs /74   Pulse 54   Ht 182.9 cm (72\")   Wt 119 kg (263 lb)   SpO2 98%   BMI 35.67 kg/m²  Body mass index is 35.67 kg/m².    General Alert and oriented. No acute distress noted   Pharynx/Throat Class IV  Mallampati airway, large tongue, no evidence of redundant lateral pharyngeal tissue. No oral lesions. No thrush. Moist mucous membranes.   Head Normocephalic. Symmetrical. Atraumatic.    Nose No septal deviation. No drainage   Chest Wall Normal shape. Symmetric expansion with respiration. No tenderness.   Neck Trachea midline, no thyromegaly or adenopathy    Lungs Clear to auscultation bilaterally. No wheezes. " No rhonchi. No rales. Respirations regular, even and unlabored.   Heart Regular rhythm and normal rate. Normal S1 and S2. No murmur   Abdomen Soft, non-tender and non-distended. Normal bowel sounds. No masses.   Extremities Moves all extremities well. No edema   Psychiatric Normal mood and affect.     Testing:  Download Dec 16-present, average nightly use of 7 hours and 31 minutes on auto CPAP 5-20 cm H2O, avg pr 12.8cm H2O, max pressure of 14.3, max leak 92 L/min, AHI 3.7, leak f 12.2 L.min.    Study-Diagnostic findings: The patient tolerated the home sleep testing with monitoring time of 425 minutes. The data obtained make this a technically adequate study. The apnea hypopneas index(AHI) was 32.8 per sleep hour.  The AHI during supine position was 64.9 per sleep hour. Mean heart rate of 58.4 BPM.  Snoring was noted 19.7% of sleep time. Lowest oxygen saturation during the study was 74%. Saturation below 89% was noted for 94.1 mins.        Impression:  1. BEKA (obstructive sleep apnea)    2. Obesity (BMI 30-39.9)    3. Air leak    4. Nocturnal hypoxia          Plan:  Change CPAP to 5-13 cm H2O to help correct leaks.  Order ovn oximetry CPAP RA to make sure CPAP appropriately corrects the desaturations. Weight loss will be strongly beneficial to reduce the severity of BEKA. I reviewed original sleep study and recent download report with patient. He uses the machine and benefits from its use.        Thank you for allowing me to participate in your patient's care.      GISELA Nicolas  [facility]  Phone: [phone]      Part of this note may be an electronic transcription/translation of spoken language to printed text using the Dragon Dictation System.

## 2023-08-01 ENCOUNTER — OFFICE VISIT (OUTPATIENT)
Dept: SLEEP MEDICINE | Facility: HOSPITAL | Age: 58
End: 2023-08-01
Payer: COMMERCIAL

## 2023-08-01 VITALS
BODY MASS INDEX: 36.03 KG/M2 | WEIGHT: 266 LBS | HEIGHT: 72 IN | DIASTOLIC BLOOD PRESSURE: 67 MMHG | HEART RATE: 67 BPM | SYSTOLIC BLOOD PRESSURE: 125 MMHG | OXYGEN SATURATION: 97 %

## 2023-08-01 DIAGNOSIS — E66.9 OBESITY (BMI 30-39.9): ICD-10-CM

## 2023-08-01 DIAGNOSIS — G47.33 OBSTRUCTIVE SLEEP APNEA: Primary | ICD-10-CM

## 2023-08-01 PROCEDURE — G0463 HOSPITAL OUTPT CLINIC VISIT: HCPCS

## 2024-06-05 ENCOUNTER — OFFICE VISIT (OUTPATIENT)
Age: 59
End: 2024-06-05
Payer: COMMERCIAL

## 2024-06-05 VITALS
BODY MASS INDEX: 36.03 KG/M2 | SYSTOLIC BLOOD PRESSURE: 118 MMHG | DIASTOLIC BLOOD PRESSURE: 74 MMHG | WEIGHT: 266 LBS | HEIGHT: 72 IN

## 2024-06-05 DIAGNOSIS — I87.302 VENOUS HYPERTENSION OF LEFT LOWER EXTREMITY: Primary | ICD-10-CM

## 2024-06-05 RX ORDER — ATORVASTATIN CALCIUM 20 MG/1
TABLET, FILM COATED ORAL
COMMUNITY
Start: 2021-03-15

## 2024-06-05 RX ORDER — ASPIRIN 81 MG/1
TABLET ORAL
COMMUNITY

## 2024-06-05 NOTE — PROGRESS NOTES
"Chief Complaint  New Patient (LLE Varicose Veins. )    Subjective      History of Present Illness  Robin Engle presents to [facility] as a new patient with complaints of painful varicose veins to his left leg.    Past History:  Medical History: has a past medical history of BEKA (obstructive sleep apnea) (2003).   Surgical History: has no past surgical history on file.   Family History: family history includes Heart disease in an other family member; Sleep apnea in an other family member.   Social History: reports that he has never smoked. He has never used smokeless tobacco. He reports current alcohol use. He reports that he does not use drugs.    (Not in a hospital admission)     Allergies: Patient has no known allergies.     Robin Engle  reports that he has never smoked. He has never used smokeless tobacco..       Objective   Vital Signs:  /74   Ht 182.9 cm (72.01\")   Wt 121 kg (266 lb)   BMI 36.07 kg/m²   Estimated body mass index is 36.07 kg/m² as calculated from the following:    Height as of this encounter: 182.9 cm (72.01\").    Weight as of this encounter: 121 kg (266 lb).           Physical Exam  Vitals reviewed.   Constitutional:       Appearance: Normal appearance. He is obese.   Cardiovascular:      Rate and Rhythm: Normal rate and regular rhythm.   Pulmonary:      Breath sounds: Normal breath sounds.   Musculoskeletal:         General: Normal range of motion.      Left lower leg: Edema present.   Neurological:      General: No focal deficit present.      Mental Status: He is alert.        Venous:Telangiectasia  Edema  Varicose Veins  CEAP Classification: 3    Result Review :                     Assessment and Plan     Diagnoses and all orders for this visit:    1. Venous hypertension of left lower extremity (Primary)  -     Venous w Reflux Lower Extremity - Unilateral CAR; Future    Robin Engle is a 58 y.o. male who presents today as a new patient " with complaints of painful varicose veins to his left leg.  He is also having associated swelling which is worse at the end of the day.  He occasionally wears a compression sleeve which gives him little relief of some of symptoms.  He has never had any surgeries to his left leg.  He did have a softball hit his left shin years ago.  No history of DVT or superficial thrombophlebitis.    We have discussed the natural history and pathophysiology of venous insufficiency as well as varicose veins.  We have discussed weight loss.  He has a desk job.  He currently monitors his daily steps and is planning to try to lose weight with diet and exercise.  He was measured for graded compression stockings.  He is to have left lower extremity class I vein mapping and reflux study done.  The physician will then discuss the results and his treatment options.           Follow Up     Return in about 6 weeks (around 7/17/2024) for assigned MD with left Class 1.  Patient was given instructions and counseling regarding his condition or for health maintenance advice. Please see specific information pulled into the AVS if appropriate.

## 2024-08-05 ENCOUNTER — OFFICE VISIT (OUTPATIENT)
Dept: SLEEP MEDICINE | Facility: HOSPITAL | Age: 59
End: 2024-08-05
Payer: COMMERCIAL

## 2024-08-05 VITALS
HEIGHT: 72 IN | BODY MASS INDEX: 36.49 KG/M2 | DIASTOLIC BLOOD PRESSURE: 84 MMHG | WEIGHT: 269.4 LBS | OXYGEN SATURATION: 97 % | SYSTOLIC BLOOD PRESSURE: 127 MMHG | HEART RATE: 65 BPM

## 2024-08-05 DIAGNOSIS — G47.30 SEVERE SLEEP APNEA: Primary | ICD-10-CM

## 2024-08-05 DIAGNOSIS — E66.9 OBESITY (BMI 30-39.9): ICD-10-CM

## 2024-08-05 PROCEDURE — G0463 HOSPITAL OUTPT CLINIC VISIT: HCPCS

## 2024-08-05 NOTE — PROGRESS NOTES
"[facility]  Telephone: [phone] / Fax: [phone] [place]  Telephone: [phone] / Fax: [phone] Karolina Reynolds    PCP: Jorge Jung MD    Reason for visit: BEKA f/u    Robin Engle is a 58 y.o.male  was last seen at [facility] sleep lab in  August 2023. He is doing great on auto CPAP 5-13cm H2O. Pressures feel comfortable. Pre treatment overall AHI was 32/hr and AHI in supine was 64/hr.  AHI on treatment is down to 1.8hr. He uses under the nose and over the mouth mask that fits well.  Leaks resolved after he has readjusted the straps. He denies interval health changes. He is working on weight loss.    SH- no tobacco, 3-5 alc per week, 4 caffeine, no energy drinks.    ROS- negative.    DME [facility]    Current Medications:    Current Outpatient Medications:     aspirin 81 MG EC tablet, aspirin 81 mg tablet,delayed release, Disp: , Rfl:     atorvastatin (LIPITOR) 20 MG tablet, , Disp: , Rfl:     cyanocobalamin ([employer] Vitamin B-12) 500 MCG tablet, , Disp: , Rfl:     Ped Multivitamins-Fl-Iron (MULTIVITAMIN WITH FLUORIDE/IRON) 0.25-10 MG/ML solution solution, Take  by mouth Daily., Disp: , Rfl:    also entered in Sleep Questionnaire    Patient  has a past medical history of BEKA (obstructive sleep apnea) (2003).    I have reviewed the Past Medical History, Past Surgical History, Social History and Family History.    Vital Signs /84   Pulse 65   Ht 182.9 cm (72.01\")   Wt 122 kg (269 lb 6.4 oz)   SpO2 97%   BMI 36.53 kg/m²  Body mass index is 36.53 kg/m².    General Alert and oriented. No acute distress noted   Pharynx/Throat Class  IV  Mallampati airway, large tongue, no evidence of redundant lateral pharyngeal tissue. No oral lesions. No thrush. Moist mucous membranes.   Head Normocephalic. Symmetrical. Atraumatic.    Nose No septal deviation. No drainage   Chest Wall Normal shape. Symmetric expansion with respiration. No tenderness.   Neck Trachea midline, no " thyromegaly or adenopathy    Lungs Clear to auscultation bilaterally. No wheezes. No rhonchi. No rales. Respirations regular, even and unlabored.   Heart Regular rhythm and normal rate. Normal S1 and S2. No murmur   Abdomen Soft, non-tender and non-distended. Normal bowel sounds. No masses.   Extremities Moves all extremities well. No edema   Psychiatric Normal mood and affect.     Testing:  Download 5/4/24-8/1/24 100% use with average nightly use of 7 hours and 1 minutes on auto CPAP 5-13cm H2O, avg pressure is 12.4cm H2O, AHI is 1.8/hr, leak is 11.6 L/min.    Study-12/6/22- Diagnostic findings: The patient tolerated the home sleep testing with monitoring time of 425 minutes. The data obtained make this a technically adequate study. The apnea hypopneas index(AHI) was 32.8 per sleep hour.  The AHI during supine position was 64.9 per sleep hour. Mean heart rate of 58.4 BPM.  Snoring was noted 19.7% of sleep time. Lowest oxygen saturation during the study was 74%. Saturation below 89% was noted for 94.1 mins.        Impression:  1. Severe sleep apnea    2. Obesity (BMI 30-39.9)          Plan:  Patient uses the CPAP device and benefits from its use in terms of reduction of hypersomnia and snoring.      Pressures appears to be within adequate range. I reviewed download report and original sleep study report with the patient.    I advised pt to contact us if PAP pressures feel excessive or insufficient.    Weight loss will be strongly beneficial to reduce the severity of sleep-disordered breathing.      I reordered CPAP supplies.    Follow up with Dr. Gudino in one year    Thank you for allowing me to participate in your patient's care.      GISELA Nicolas  [facility]  Phone: [phone]      Part of this note may be an electronic transcription/translation of spoken language to printed text using the Dragon Dictation System.

## 2024-08-14 NOTE — PROGRESS NOTES
"Chief Complaint  Follow-up (Follow up with class 1 mapping )    Subjective      HPI: Robin Engle is a 58 y.o. male initially evaluated by our nurse practitioner Dyana, whom I am meeting today for the first time for evaluation of varicose veins of the left lower extremity.  At 1 time he twisted his knee while playing golf, and had some knee swelling.  Up until then, and indeed currently, his leg is not bothering him.  Even with specific questioning he denies aching and throbbing pain, heaviness, fatigability, itching, burning and swelling.  No prior history of venous thrombosis.    Objective   Vital Signs:  Ht 182.9 cm (72.01\")   Wt 122 kg (269 lb)   BMI 36.47 kg/m²   Estimated body mass index is 36.47 kg/m² as calculated from the following:    Height as of this encounter: 182.9 cm (72.01\").    Weight as of this encounter: 122 kg (269 lb).      Robin Engle  reports that he has never smoked. He has never used smokeless tobacco..     Exam: BMI 36.5.  There are 6-8 mm nontender, compressible varicose veins in the medial and posteromedial left calf, which by palpation and compression seem to relate to both the GSV and SSV systems.  Calves are soft and nontender.  No swelling or pitting edema.  Ankle circumferences 25 cm bilaterally.  Calf circumference is 46 and 45 cm on the right and left, respectively.    Personal review of data: Dyana's note 6/5/2024.  Images and tracings of left lower extremity vein map performed today.     Assessment and Plan     Diagnoses and all orders for this visit:    1. Asymptomatic varicose veins of left lower extremity (Primary)    Summary: 58-year-old gentleman with varicose veins of the left lower extremity.  At 1 time he had swelling in the left knee area, related to a knee injury.  Otherwise he is now asymptomatic.  Duplex demonstrates GSV and SSV ablations.  The patient is asymptomatic and as such I recommend noninterventional management.  In addition he has no " cosmetic concerns about his veins.  Thus there is no indication to do anything.  I discussed about the natural history of varicose veins and their management in general terms.  I discussed that HASTI symptoms.  His choice about whether to wear compression.  Return again on request.    Follow Up     Return if symptoms worsen or fail to improve.  Patient was given instructions and counseling regarding his condition or for health maintenance advice. Please see specific information pulled into the AVS if appropriate.

## 2024-08-15 ENCOUNTER — HOSPITAL ENCOUNTER (OUTPATIENT)
Facility: HOSPITAL | Age: 59
Discharge: HOME | End: 2024-08-15
Admitting: NURSE PRACTITIONER
Payer: COMMERCIAL

## 2024-08-15 ENCOUNTER — OFFICE VISIT (OUTPATIENT)
Age: 59
End: 2024-08-15
Payer: COMMERCIAL

## 2024-08-15 VITALS — WEIGHT: 269 LBS | HEIGHT: 72 IN | BODY MASS INDEX: 36.44 KG/M2

## 2024-08-15 DIAGNOSIS — I87.302 VENOUS HYPERTENSION OF LEFT LOWER EXTREMITY: ICD-10-CM

## 2024-08-15 DIAGNOSIS — I83.92 ASYMPTOMATIC VARICOSE VEINS OF LEFT LOWER EXTREMITY: Primary | ICD-10-CM

## 2024-08-15 PROCEDURE — 93971 EXTREMITY STUDY: CPT

## 2024-08-15 NOTE — PATIENT INSTRUCTIONS
Compression Stockings    Prescription graded compression stockings have been proven to help people with varicose veins, swelling, blood clots, and venous ulcers.  Compression stockings reduce blood pooling, reduce swelling, reduce the chance of blood clots, and heal venous ulcers.  They also relieve pain.    Compression stockings are available in different styles and strengths.  Just as pills have numbers indicating their doses in milligrams, stockings have numbers indicating their doses in millimeters of mercury, or mmHg.  Stockings with compression less than 20 mmHg can alleviate mild symptoms, but may not be tight enough for severe symptoms.  20-30 mmHg stockings are more snug, and treat advanced symptoms.  At times, severe symptoms may require a 30-40 mmHg stocking.  No matter what compression dose is chosen, everyone must be measured for stockings to ensure a proper fit.    Choosing the tightness of a stocking is a trade-off between the loosest stocking that feels good and the tightest stocking a person can apply.  The correct tightness may be different from person to person.  For example a person may need a 30-40 mmHg stocking to treat symptoms, but finger arthritis, reduced  strength, and back pain leave them unable to apply it.  A 20-30 mmHg stocking may be the best the person can do.  A looser stocking that a person actually wears may improve quality of life more than a tighter stocking that sits at home in a drawer.  There are many brands of stockings, and many are good.  [employer] and [employer] are companies that produce medical grade compression stockings of good quality.    Stockings are available in knee-high, thigh-high, and panty styles.  Each style has advantages and disadvantages.  Knee high stockings are easier to put on and take off.  Though stockings do not cut off the circulation to the calves and feet, some people feel like they do and do not like to wear them for this reason.  A  thigh high stocking is a bit warmer and gives a smooth transition below the knee so it doesn't feel like it is cutting off the circulation.  But thigh high stockings can roll down.  “It Stays” is a roll-on adhesive that can prevent roll-down.  Panty style stockings are most challenging to apply and remove, and are the warmest to wear, but give compression to the mid thigh.  They won't roll down, but they have to be dealt with when using the rest room.  In general, nonprescription calf high stockings cost around $30. For prescription style stockings, calf high styles cost about $50, thigh high about $75, and panty style about $120.  20-30 mmHg stockings generally cost the same as 30-40 mmHg stockings.  They are usually not covered by insurance.     It is best to wear the stockings all day, every day, especially if swelling, discoloration, or sores are present. If leg symptoms are less severe, stockings can be worn as needed, like when a person expects to be standing for a long time, or when experience has shown that the legs are going to hurt and swell.  Stockings help leg symptoms when they are worn, but will not help when they are not worn, and do not correct the underlying disease process.  For most people it is unnecessary to wear them at night, but it is not harmful to do so.  If it is hard to put on and take off stockings, changing them every other night can reduce by one half the number of application/removal cycles.    Many people have problems applying stockings because they do not apply them correctly.  Stockings should be applied like pants, allowing them to hang down.  First slide them over the foot and heel and work them up from there.  Bunching the stocking up like nylons is a sure setup for failure.  Devices like an Easy Slide, Magnide and Kuth-E-Concwx are available to help one to put on stockings.  We can demonstrate proper application techniques and the use of these devices.  There are a number of  Youtube videos outlining stocking strategies as well. Stockings can be applied, worn, and removed by anyone who is motivated to wear them.

## 2024-08-16 LAB
BH CV LEFT LOWER VAS AA GSV TRANS DIAMETER: 0.34 CM
BH CV LEFT LOWER VAS COMMON FEMORAL REFLUX TIME: 5.02 SEC
BH CV LEFT LOWER VAS EXT ILIAC REFLUX COLOR FLOW TIME: 2.87 SEC
BH CV LEFT LOWER VAS GSV KNEE REFLUX TIME: 10.01 SEC
BH CV LEFT LOWER VAS GSV KNEE TRANSVERSE DIAMETER: 0.78 CM
BH CV LEFT LOWER VAS GSV PROX REFLUX TIME: 4.24 SEC
BH CV LEFT LOWER VAS GSV PROX TRANSVERSE DIAMETER: 1.11 CM
BH CV LEFT LOWER VAS MID FEMORAL REFLUX TIME: 1.47 SEC
BH CV LEFT LOWER VAS PERFORATOR 1 REFLUX TIME: 1.06 SEC
BH CV LEFT LOWER VAS PERFORATOR 1 TRANS DIAMETER: 0.38 CM
BH CV LEFT LOWER VAS SAPHENOFEMORAL JUNCTION REFLUX TIME: 4.7 SEC
BH CV LEFT LOWER VAS SAPHENOFEMORAL JUNCTION TRANSVERSE DIAMETER: 0.89 CM
BH CV LEFT LOWER VAS SSV MID CALF REFLUX TIME: 2.5 SEC
BH CV LEFT LOWER VAS SSV MID CALF TRANS DIAMETER: 0.27 CM
BH CV LEFT LOWER VAS SSV PROX CALF TRANS DIAMETER: 0.52 CM
BH CV LOW VAS LEFT EXTERNAL ILIAC AUGMENT: NORMAL
BH CV LOW VAS LEFT EXTERNAL ILIAC COMPRESS: NORMAL
BH CV LOWER VAS LEFT GSV DIST THIGH COMPRESSIBILTY: NORMAL
BH CV LOWER VASCULAR LEFT AA GSV COMPETENT: NORMAL
BH CV LOWER VASCULAR LEFT AA GSV COMPRESS: NORMAL
BH CV LOWER VASCULAR LEFT COMMON FEMORAL AUGMENT: NORMAL
BH CV LOWER VASCULAR LEFT COMMON FEMORAL COMPETENT: NORMAL
BH CV LOWER VASCULAR LEFT COMMON FEMORAL COMPRESS: NORMAL
BH CV LOWER VASCULAR LEFT COMMON FEMORAL PHASIC: NORMAL
BH CV LOWER VASCULAR LEFT COMMON FEMORAL SPONT: NORMAL
BH CV LOWER VASCULAR LEFT DISTAL FEMORAL COMPRESS: NORMAL
BH CV LOWER VASCULAR LEFT EXTERNAL ILIAC COMPETENT: NORMAL
BH CV LOWER VASCULAR LEFT EXTERNAL ILIAC PHASIC: NORMAL
BH CV LOWER VASCULAR LEFT EXTERNAL ILIAC SPONT: NORMAL
BH CV LOWER VASCULAR LEFT GASTRONEMIUS COMPRESS: NORMAL
BH CV LOWER VASCULAR LEFT GREATER SAPH AK COMPETENT: NORMAL
BH CV LOWER VASCULAR LEFT GREATER SAPH AK COMPRESS: NORMAL
BH CV LOWER VASCULAR LEFT GSV DIST THIGH COMPETENT: NORMAL
BH CV LOWER VASCULAR LEFT LESSER SAPH COMPETENT: NORMAL
BH CV LOWER VASCULAR LEFT LESSER SAPH COMPRESS: NORMAL
BH CV LOWER VASCULAR LEFT MID FEMORAL AUGMENT: NORMAL
BH CV LOWER VASCULAR LEFT MID FEMORAL COMPETENT: NORMAL
BH CV LOWER VASCULAR LEFT MID FEMORAL COMPRESS: NORMAL
BH CV LOWER VASCULAR LEFT MID FEMORAL PHASIC: NORMAL
BH CV LOWER VASCULAR LEFT MID FEMORAL SPONT: NORMAL
BH CV LOWER VASCULAR LEFT PERFORATOR 1 COMPETENT: NORMAL
BH CV LOWER VASCULAR LEFT PERFORATOR 1 COMPRESS: NORMAL
BH CV LOWER VASCULAR LEFT PERFORATOR 1 LOCATION: NORMAL
BH CV LOWER VASCULAR LEFT PERONEAL COMPRESS: NORMAL
BH CV LOWER VASCULAR LEFT POPLITEAL AUGMENT: NORMAL
BH CV LOWER VASCULAR LEFT POPLITEAL COMPETENT: NORMAL
BH CV LOWER VASCULAR LEFT POPLITEAL COMPRESS: NORMAL
BH CV LOWER VASCULAR LEFT POPLITEAL PHASIC: NORMAL
BH CV LOWER VASCULAR LEFT POPLITEAL SPONT: NORMAL
BH CV LOWER VASCULAR LEFT POSTERIOR TIBIAL COMPRESS: NORMAL
BH CV LOWER VASCULAR LEFT PROFUNDA FEMORAL COMPRESS: NORMAL
BH CV LOWER VASCULAR LEFT PROXIMAL FEMORAL COMPRESS: NORMAL
BH CV LOWER VASCULAR LEFT SAPHENOFEMORAL JUNCTION AUGMENT: NORMAL
BH CV LOWER VASCULAR LEFT SAPHENOFEMORAL JUNCTION COMPETENT: NORMAL
BH CV LOWER VASCULAR LEFT SAPHENOFEMORAL JUNCTION COMPRESS: NORMAL
BH CV LOWER VASCULAR LEFT SAPHENOFEMORAL JUNCTION PHASIC: NORMAL
BH CV LOWER VASCULAR LEFT SAPHENOFEMORAL JUNCTION SPONT: NORMAL
BH CV LOWER VASCULAR LEFT SSV MID CALF COMPETENT: NORMAL
BH CV LOWER VASCULAR LEFT SSV MID CALF COMPRESS: NORMAL
BH CV LOWER VASCULAR RIGHT COMMON FEMORAL AUGMENT: NORMAL
BH CV LOWER VASCULAR RIGHT COMMON FEMORAL COMPETENT: NORMAL
BH CV LOWER VASCULAR RIGHT COMMON FEMORAL COMPRESS: NORMAL
BH CV LOWER VASCULAR RIGHT COMMON FEMORAL PHASIC: NORMAL
BH CV LOWER VASCULAR RIGHT COMMON FEMORAL SPONT: NORMAL
BH CV RIGHT LOWER VAS COMMON FEMORAL REFLUX COLOR FLOW TIME: 2.08 SEC

## 2024-10-25 ENCOUNTER — LAB (OUTPATIENT)
Dept: LAB | Facility: HOSPITAL | Age: 59
End: 2024-10-25
Payer: COMMERCIAL

## 2024-10-25 ENCOUNTER — TRANSCRIBE ORDERS (OUTPATIENT)
Dept: INTERNAL MEDICINE | Facility: CLINIC | Age: 59
End: 2024-10-25
Payer: COMMERCIAL

## 2024-10-25 DIAGNOSIS — E78.5 HYPERLIPIDEMIA, UNSPECIFIED HYPERLIPIDEMIA TYPE: Primary | ICD-10-CM

## 2024-10-25 DIAGNOSIS — R73.09 OTHER ABNORMAL GLUCOSE: ICD-10-CM

## 2024-10-25 DIAGNOSIS — E78.5 HYPERLIPIDEMIA, UNSPECIFIED HYPERLIPIDEMIA TYPE: ICD-10-CM

## 2024-10-25 LAB
ALBUMIN SERPL-MCNC: 3.9 G/DL (ref 3.5–5.2)
ALBUMIN/GLOB SERPL: 1.6 G/DL
ALP SERPL-CCNC: 72 U/L (ref 39–117)
ALT SERPL W P-5'-P-CCNC: 21 U/L (ref 1–41)
ANION GAP SERPL CALCULATED.3IONS-SCNC: 7.2 MMOL/L (ref 5–15)
AST SERPL-CCNC: 20 U/L (ref 1–40)
BILIRUB SERPL-MCNC: 0.9 MG/DL (ref 0–1.2)
BUN SERPL-MCNC: 19 MG/DL (ref 6–20)
BUN/CREAT SERPL: 21.6 (ref 7–25)
CALCIUM SPEC-SCNC: 8.9 MG/DL (ref 8.6–10.5)
CHLORIDE SERPL-SCNC: 106 MMOL/L (ref 98–107)
CHOLEST SERPL-MCNC: 133 MG/DL (ref 0–200)
CO2 SERPL-SCNC: 25.8 MMOL/L (ref 22–29)
CREAT SERPL-MCNC: 0.88 MG/DL (ref 0.76–1.27)
EGFRCR SERPLBLD CKD-EPI 2021: 99.1 ML/MIN/1.73
GLOBULIN UR ELPH-MCNC: 2.5 GM/DL
GLUCOSE SERPL-MCNC: 105 MG/DL (ref 65–99)
HBA1C MFR BLD: 5.6 % (ref 4.8–5.6)
HDLC SERPL-MCNC: 51 MG/DL (ref 40–60)
LDLC SERPL CALC-MCNC: 69 MG/DL (ref 0–100)
LDLC/HDLC SERPL: 1.35 {RATIO}
POTASSIUM SERPL-SCNC: 4.5 MMOL/L (ref 3.5–5.2)
PROT SERPL-MCNC: 6.4 G/DL (ref 6–8.5)
SODIUM SERPL-SCNC: 139 MMOL/L (ref 136–145)
TRIGL SERPL-MCNC: 65 MG/DL (ref 0–150)
VLDLC SERPL-MCNC: 13 MG/DL (ref 5–40)

## 2024-10-25 PROCEDURE — 36415 COLL VENOUS BLD VENIPUNCTURE: CPT

## 2024-10-25 PROCEDURE — 83036 HEMOGLOBIN GLYCOSYLATED A1C: CPT

## 2024-10-25 PROCEDURE — 80053 COMPREHEN METABOLIC PANEL: CPT

## 2024-10-25 PROCEDURE — 80061 LIPID PANEL: CPT

## 2024-10-28 PROBLEM — E66.9 OBESITY (BMI 35.0-39.9 WITHOUT COMORBIDITY): Status: ACTIVE | Noted: 2024-10-28

## 2024-10-28 PROBLEM — Z00.00 HEALTHCARE MAINTENANCE: Status: ACTIVE | Noted: 2024-10-28

## 2024-10-28 PROBLEM — E78.2 MIXED HYPERLIPIDEMIA: Status: ACTIVE | Noted: 2024-10-28

## 2024-10-30 ENCOUNTER — OFFICE VISIT (OUTPATIENT)
Dept: INTERNAL MEDICINE | Facility: CLINIC | Age: 59
End: 2024-10-30
Payer: COMMERCIAL

## 2024-10-30 VITALS
OXYGEN SATURATION: 96 % | HEART RATE: 68 BPM | TEMPERATURE: 98 F | HEIGHT: 72 IN | RESPIRATION RATE: 16 BRPM | DIASTOLIC BLOOD PRESSURE: 84 MMHG | SYSTOLIC BLOOD PRESSURE: 122 MMHG | BODY MASS INDEX: 37.25 KG/M2 | WEIGHT: 275 LBS

## 2024-10-30 DIAGNOSIS — E78.2 MIXED HYPERLIPIDEMIA: ICD-10-CM

## 2024-10-30 DIAGNOSIS — Z00.00 HEALTHCARE MAINTENANCE: ICD-10-CM

## 2024-10-30 DIAGNOSIS — R73.09 ELEVATED GLUCOSE: ICD-10-CM

## 2024-10-30 DIAGNOSIS — Z12.5 PROSTATE CANCER SCREENING: ICD-10-CM

## 2024-10-30 DIAGNOSIS — E66.9 OBESITY (BMI 35.0-39.9 WITHOUT COMORBIDITY): ICD-10-CM

## 2024-10-30 DIAGNOSIS — I87.302 VENOUS HYPERTENSION OF LEFT LOWER EXTREMITY: ICD-10-CM

## 2024-10-30 DIAGNOSIS — Z11.59 NEED FOR HEPATITIS C SCREENING TEST: ICD-10-CM

## 2024-10-30 DIAGNOSIS — G47.33 OSA (OBSTRUCTIVE SLEEP APNEA): Primary | ICD-10-CM

## 2024-10-30 PROCEDURE — 99204 OFFICE O/P NEW MOD 45 MIN: CPT | Performed by: INTERNAL MEDICINE

## 2024-10-30 RX ORDER — ATORVASTATIN CALCIUM 20 MG/1
20 TABLET, FILM COATED ORAL NIGHTLY
Qty: 90 TABLET | Refills: 3 | Status: SHIPPED | OUTPATIENT
Start: 2024-10-30

## 2024-10-30 NOTE — PROGRESS NOTES
"Chief Complaint  Hyperlipidemia    Subjective        Robin Engle presents to [facility]  Hyperlipidemia         History of Present Illness  The patient is a 58-year-old male who presents for evaluation of multiple medical concerns.    He reports experiencing pain in his left leg, specifically behind the knee. He has a history of right hip replacement and occasional left hip pain, which he describes as temporary. He consulted Dr. Sumeet Duque and underwent a scan of his left leg, which showed no abnormalities. He has been diagnosed with left varicosities and uses a compression sleeve for relief.    He admits to not being as active as recommended but engages in activities such as golf, yard work, and pickleball. He also exercises on a treadmill and lifts light weights. He acknowledges a habit of late-night snacking, particularly on salty foods, and is seeking advice on how to manage this. He reports no issues with his current medications and is making an effort to increase his physical activity.    He continues to use his CPAP machine and reports improved sleep quality with its use.    IMMUNIZATIONS  He had influenza vaccine yesterday. He had tetanus vaccine in 09/2019.      Objective   Vital Signs:  /84   Pulse 68   Temp 98 °F (36.7 °C) (Oral)   Resp 16   Ht 182.9 cm (72.01\")   Wt 125 kg (275 lb)   SpO2 96%   BMI 37.29 kg/m²   Estimated body mass index is 37.29 kg/m² as calculated from the following:    Height as of this encounter: 182.9 cm (72.01\").    Weight as of this encounter: 125 kg (275 lb).          Past Medical History:   Diagnosis Date    BEKA (obstructive sleep apnea) 2003    Lost weight and symptoms improved        Family History   Problem Relation Age of Onset    Heart disease Other     Sleep apnea Other         Social History     Socioeconomic History    Marital status:    Tobacco Use    Smoking status: Never    Smokeless tobacco: Never   Substance and " Sexual Activity    Alcohol use: Yes     Comment: 3-4/week    Drug use: No    Sexual activity: Defer        Review of Systems     Physical Exam  Vitals reviewed.   Constitutional:       Appearance: Normal appearance.   HENT:      Head: Normocephalic and atraumatic.   Eyes:      Extraocular Movements: Extraocular movements intact.      Conjunctiva/sclera: Conjunctivae normal.      Pupils: Pupils are equal, round, and reactive to light.   Cardiovascular:      Rate and Rhythm: Normal rate and regular rhythm.      Pulses: Normal pulses.      Heart sounds: Normal heart sounds.   Pulmonary:      Effort: Pulmonary effort is normal.      Breath sounds: Normal breath sounds.   Abdominal:      General: Bowel sounds are normal.      Palpations: Abdomen is soft.   Musculoskeletal:         General: Normal range of motion.      Cervical back: Normal range of motion and neck supple.   Skin:     General: Skin is warm and dry.   Neurological:      General: No focal deficit present.      Mental Status: He is alert. Mental status is at baseline.   Psychiatric:         Mood and Affect: Mood normal.         Behavior: Behavior normal.         Thought Content: Thought content normal.        Lab Results   Component Value Date    GLUCOSE 105 (H) 10/25/2024    BUN 19 10/25/2024    CREATININE 0.88 10/25/2024     10/25/2024    K 4.5 10/25/2024     10/25/2024    CALCIUM 8.9 10/25/2024    PROTEINTOT 6.4 10/25/2024    ALBUMIN 3.9 10/25/2024    ALT 21 10/25/2024    AST 20 10/25/2024    ALKPHOS 72 10/25/2024    BILITOT 0.9 10/25/2024    GLOB 2.5 10/25/2024    AGRATIO 1.6 10/25/2024    BCR 21.6 10/25/2024    ANIONGAP 7.2 10/25/2024    EGFR 99.1 10/25/2024      Lipid Panel          10/25/2024    09:04   Lipid Panel   Total Cholesterol 133    Triglycerides 65    HDL Cholesterol 51    VLDL Cholesterol 13    LDL Cholesterol  69    LDL/HDL Ratio 1.35           Lab 10/25/24  0904   HEMOGLOBIN A1C 5.60        Result Review :    Common labs           4/24/2024    09:24 10/25/2024    09:04   Common Labs   Glucose  105    BUN  19    Creatinine  0.88    Sodium  139    Potassium  4.5    Chloride  106    Calcium  8.9    Albumin  3.9    Total Bilirubin  0.9    Alkaline Phosphatase  72    AST (SGOT)  20    ALT (SGPT)  21    WBC 5.30        Hemoglobin 15.2        Hematocrit 47.4        Platelets 215        Total Cholesterol  133    Triglycerides  65    HDL Cholesterol  51    LDL Cholesterol   69    Hemoglobin A1C 5.7     5.60    PSA 1.49           Details          This result is from an external source.                       Assessment and Plan   Diagnoses and all orders for this visit:    1. BEKA (obstructive sleep apnea) (Primary)    2. Healthcare maintenance  -     Hepatitis C Antibody; Future  -     CBC & Differential; Future  -     TSH; Future  -     Urinalysis With Microscopic - Urine, Clean Catch; Future  -     Vitamin B12; Future    3. Obesity (BMI 35.0-39.9 without comorbidity)    4. Mixed hyperlipidemia  -     atorvastatin (LIPITOR) 20 MG tablet; Take 1 tablet by mouth Every Night.  Dispense: 90 tablet; Refill: 3  -     Lipid Panel With LDL / HDL Ratio; Future  -     Comprehensive Metabolic Panel; Future    5. Venous hypertension of left lower extremity    6. Need for hepatitis C screening test  -     Hepatitis C Antibody; Future    7. Prostate cancer screening  -     PSA Screen; Future    8. Elevated glucose  -     Hemoglobin A1c; Future        Assessment & Plan  1. Obesity.  His total cholesterol is 133 and LDL is 69, indicating that his cholesterol is well-managed. His blood sugar is slightly elevated at 105, but his A1c is within the normal range. His BMI is above 30. He was advised to maintain a healthy diet and regular exercise regimen. The prescription for atorvastatin 20 mg nightly was renewed and will be filled through [facility]. The potential benefits and side effects of Wegovy were discussed, and he was advised to consider this medication for  weight loss. If interested, he can contact the office via [employer].    2. Elevated blood sugar.  His blood sugar was slightly elevated at 105, but his A1c is now within the normal range. He was advised to continue dietary changes and increase physical activity to manage his blood sugar levels.    3. Health Maintenance.  A hepatitis screen was ordered for his next blood work. His vitamin B12 and PSA levels will be checked during his next visit. He was given the option to continue with Cologuard or repeat a colonoscopy in 2025 with Dr. Pravin Story. He received his flu shot on 10/29/2024.    4. Sleep apnea.  He was advised to continue using his CPAP machine.  5.  Hyperlipidemia.  Patient is continue atorvastatin 20 mg.  Patient encouraged to reduce saturated fats.  Patient also encouraged to increase exercise to 150 minutes weekly 530-minute episodes or for 40-minute episodes.  Calorie reduction would be beneficial.  Long discussion spoken with patient about starting weight loss medications either Wegovy or Zepbound.  Patient will investigate with insurance.  Follow-up  Patient is scheduled for a follow-up visit in 6 months for his physical.            Follow Up   Return in about 6 months (around 4/30/2025) for Annual physical.  Patient was given instructions and counseling regarding his condition or for health maintenance advice. Please see specific information pulled into the AVS if appropriate.           Patient or patient representative verbalized consent for the use of Ambient Listening during the visit with  Jorge Jung MD for chart documentation. 10/30/2024  09:11 EDT       Answers submitted by the patient for this visit:  Other (Submitted on 10/23/2024)  Please describe your symptoms.: It is my understanding that this is a 6 month check up related to my atorvastatin prescription. I have no symptoms to review.  Have you had these symptoms before?: Yes  How long have you been having these symptoms?: Greater  than 2 weeks  Please list any medications you are currently taking for this condition.: atorvastatin  Please describe any probable cause for these symptoms. : N/A  Primary Reason for Visit (Submitted on 10/23/2024)  What is the primary reason for your visit?: Problem Not Listed

## 2025-05-08 ENCOUNTER — LAB (OUTPATIENT)
Dept: LAB | Facility: HOSPITAL | Age: 60
End: 2025-05-08
Payer: COMMERCIAL

## 2025-05-08 PROCEDURE — 80050 GENERAL HEALTH PANEL: CPT | Performed by: INTERNAL MEDICINE

## 2025-05-08 PROCEDURE — 80061 LIPID PANEL: CPT | Performed by: INTERNAL MEDICINE

## 2025-05-08 PROCEDURE — 82607 VITAMIN B-12: CPT | Performed by: INTERNAL MEDICINE

## 2025-05-08 PROCEDURE — G0103 PSA SCREENING: HCPCS | Performed by: INTERNAL MEDICINE

## 2025-05-08 PROCEDURE — 83036 HEMOGLOBIN GLYCOSYLATED A1C: CPT | Performed by: INTERNAL MEDICINE

## 2025-05-08 PROCEDURE — 86803 HEPATITIS C AB TEST: CPT | Performed by: INTERNAL MEDICINE

## 2025-05-12 ENCOUNTER — OFFICE VISIT (OUTPATIENT)
Dept: INTERNAL MEDICINE | Facility: CLINIC | Age: 60
End: 2025-05-12
Payer: COMMERCIAL

## 2025-05-12 VITALS
BODY MASS INDEX: 37.52 KG/M2 | HEART RATE: 64 BPM | RESPIRATION RATE: 16 BRPM | HEIGHT: 72 IN | DIASTOLIC BLOOD PRESSURE: 78 MMHG | SYSTOLIC BLOOD PRESSURE: 126 MMHG | OXYGEN SATURATION: 97 % | WEIGHT: 277 LBS

## 2025-05-12 DIAGNOSIS — E78.2 MIXED HYPERLIPIDEMIA: Primary | ICD-10-CM

## 2025-05-12 DIAGNOSIS — Z23 NEED FOR VACCINATION: ICD-10-CM

## 2025-05-12 DIAGNOSIS — E66.9 OBESITY (BMI 35.0-39.9 WITHOUT COMORBIDITY): ICD-10-CM

## 2025-05-12 PROCEDURE — 90471 IMMUNIZATION ADMIN: CPT | Performed by: INTERNAL MEDICINE

## 2025-05-12 PROCEDURE — 99213 OFFICE O/P EST LOW 20 MIN: CPT | Performed by: INTERNAL MEDICINE

## 2025-05-12 PROCEDURE — 90677 PCV20 VACCINE IM: CPT | Performed by: INTERNAL MEDICINE

## 2025-05-12 PROCEDURE — 99396 PREV VISIT EST AGE 40-64: CPT | Performed by: INTERNAL MEDICINE

## 2025-05-12 RX ORDER — ATORVASTATIN CALCIUM 20 MG/1
20 TABLET, FILM COATED ORAL NIGHTLY
Qty: 90 TABLET | Refills: 3 | Status: SHIPPED | OUTPATIENT
Start: 2025-05-12

## 2025-05-12 NOTE — PROGRESS NOTES
Subjective   Robin Engle is a 59 y.o. male and is here for a comprehensive physical exam. The patient reports  left upper arm tingling occasionally.  Still unsuccessful at weight loss.  Remains active playing golf and pickleball 2 to 3 days/week. .      History of Present Illness  The patient is a 59-year-old male who presents for an annual physical exam and follow-up of hyperlipidemia and obesity.    He reports experiencing intermittent tingling sensations in his arm, particularly when lying prone. He does not endorse any associated elbow pain or symptoms suggestive of tendinitis. His recreational activities include golfing once or twice a week and playing pickleball with friends on Tuesday nights at [facility] for approximately 2 hours.    He typically wakes up once during the night to urinate, which he considers normal. He occasionally consumes fluids at night but avoids caffeinated beverages. He has not yet received the pneumonia vaccine but is open to doing so. He received the influenza vaccine at his workplace last fall and plans to get the new COVID-19 vaccine. He donated blood about a month ago and takes a B12 vitamin supplement. He also takes a multivitamin and has recently switched to a men's over 50 formulation. He acknowledges that his diet, particularly his snacking habits and bourbon consumption, contributes to his weight gain.    He has been using compression stockings, which he finds beneficial. He has not had a recent consultation with Dr. Duque. He reports no leg pain and has been advised by Dr. Duque that as long as he remains asymptomatic, no intervention is necessary.    SOCIAL HISTORY  He drinks bourbon.    FAMILY HISTORY  His mother had thyroid issues.           Social History:   Social History     Socioeconomic History    Marital status:    Tobacco Use    Smoking status: Never    Smokeless tobacco: Never   Substance and Sexual Activity    Alcohol use: Yes     Comment: 3-4/week    Drug  use: No    Sexual activity: Defer       Family History:   Family History   Problem Relation Age of Onset    Heart disease Other     Sleep apnea Other        Past Medical History:   Past Medical History:   Diagnosis Date    BEKA (obstructive sleep apnea) 2003    Lost weight and symptoms improved       The following portions of the patient's history were reviewed and updated as appropriate: allergies, current medications, past family history, past medical history, past social history, past surgical history and problem list.    Review of Systems  Review of Systems was performed, and pertinent findings are noted in the HPI.    Objective   Physical Exam  Vitals reviewed.   Constitutional:       Appearance: Normal appearance. He is obese.   HENT:      Head: Normocephalic and atraumatic.   Eyes:      Extraocular Movements: Extraocular movements intact.      Conjunctiva/sclera: Conjunctivae normal.      Pupils: Pupils are equal, round, and reactive to light.   Cardiovascular:      Rate and Rhythm: Normal rate and regular rhythm.      Pulses: Normal pulses.      Heart sounds: Normal heart sounds.   Pulmonary:      Effort: Pulmonary effort is normal.      Breath sounds: Normal breath sounds.   Abdominal:      General: Bowel sounds are normal.      Palpations: Abdomen is soft.   Musculoskeletal:         General: Normal range of motion.      Cervical back: Normal range of motion and neck supple.      Comments: Varicosities to left calf.  Nontender to palpation, no discoloration.   Skin:     General: Skin is warm and dry.   Neurological:      General: No focal deficit present.      Mental Status: He is alert. Mental status is at baseline.   Psychiatric:         Mood and Affect: Mood normal.         Behavior: Behavior normal.         Thought Content: Thought content normal.         Vitals:    05/12/25 0909   BP: 126/78   Pulse: 64   Resp: 16   SpO2: 97%     Body mass index is 37.56 kg/m².    Common labs          10/25/2024     09:04 5/8/2025    08:22   Common Labs   Glucose 105  92    BUN 19  18    Creatinine 0.88  0.79    Sodium 139  141    Potassium 4.5  4.6    Chloride 106  106    Calcium 8.9  8.5    Albumin 3.9  3.9    Total Bilirubin 0.9  0.8    Alkaline Phosphatase 72  79    AST (SGOT) 20  29    ALT (SGPT) 21  31    WBC  5.22    Hemoglobin  14.9    Hematocrit  45.3    Platelets  234    Total Cholesterol 133  144    Triglycerides 65  84    HDL Cholesterol 51  58    LDL Cholesterol  69  70    Hemoglobin A1C 5.60  5.60    PSA  1.500        Medications:   Current Outpatient Medications:     atorvastatin (LIPITOR) 20 MG tablet, Take 1 tablet by mouth Every Night., Disp: 90 tablet, Rfl: 3    cyanocobalamin ([employer] Vitamin B-12) 500 MCG tablet, , Disp: , Rfl:     Ped Multivitamins-Fl-Iron (MULTIVITAMIN WITH FLUORIDE/IRON) 0.25-10 MG/ML solution solution, Take  by mouth Daily., Disp: , Rfl:        Assessment & Plan   Healthy male exam.      1. Healthcare Maintenance:  2. Patient Counseling:  --Nutrition: Stressed importance of moderation in sodium/caffeine intake, saturated fat and cholesterol, caloric balance, sufficient intake of fresh fruits, vegetables, fiber, calcium and vit D  --Exercise: Recommended 30 minutes of exercise daily.   --Immunizations reviewed.  --Discussed benefits of screening colonoscopy.    Diagnoses and all orders for this visit:    Mixed hyperlipidemia  -     atorvastatin (LIPITOR) 20 MG tablet; Take 1 tablet by mouth Every Night.  -     Comprehensive Metabolic Panel; Future  -     Lipid Panel; Future  -     Urinalysis With Microscopic - Urine, Clean Catch; Future    Obesity (BMI 35.0-39.9 without comorbidity)    Need for vaccination  -     Pneumococcal Conjugate Vaccine 20-Valent All             1. Hyperlipidemia.  - Lipid panel results are within the optimal range, with a total cholesterol of 144 mg/dL, high HDL, and low LDL levels.  - Current low-dose atorvastatin regimen is effective.  - Continued  discussion on maintaining lipid levels through medication and lifestyle.  - Prescription Drug Monitoring Program was reviewed.    2. Obesity.  - Weight has increased by 2 pounds since last year.  - Advised to maintain a balanced diet and regular exercise regimen to manage weight and reduce the risk of developing diabetes.  - Discussion on the impact of weight on overall health and diabetes risk.  - Encouraged to monitor dietary habits, particularly post-dinner snacking.    3. Tingling in the arm.  - Reports occasional tingling in the arm, especially when lying on the stomach.  - Physical exam suggests nerve compression from sleeping position.  - Advised to avoid sleeping on the stomach to prevent further nerve compression.  - No additional medications or therapies prescribed at this time.    4. Health Maintenance.  - Colon cancer screening is due in 05/2026.  - Tetanus vaccination is valid until 2029, and both doses of the shingles vaccine have been received.  - CBC, B12 level, thyroid function, A1c, kidney function, protein level, liver enzymes, and PSA are all within normal limits.  - Presence of ketones in urine attributed to fasting prior to the test.  - Pneumonia vaccine will be administered today.  - Encouraged to get the new COVID-19 vaccine at the pharmacy.    5. Leg pain.  - Using compression stockings, which are beneficial.  - Reports no leg pain.  - Advised by Dr. Duque that no intervention is necessary as long as asymptomatic.  - No further treatment or referrals required at this time.         Return in about 6 months (around 11/12/2025) for Recheck.             Patient or patient representative verbalized consent for the use of Ambient Listening during the visit with  Jorge Jung MD for chart documentation. 5/12/2025  09:44 EDT    05/12/25   09:43 EDT      Answers submitted by the patient for this visit:  Problem not listed (Submitted on 5/12/2025)  Chief Complaint: Other medical problem  Reason  for appointment: annual scheduled visit  abdominal pain: No  joint pain: No  change in stool: No  chest pain: No  chills: No  nasal congestion: No  cough: No  diaphoresis: No  headaches: No  joint swelling: No  myalgias: No  nausea: No  neck pain: No  numbness: No  rash: No  sore throat: No  dysuria: No  vertigo: No  visual change: No  vomiting: No  weakness: No  Onset: in the past 7 days  Chronicity: new  Frequency: intermittently  Medications tried: none  Additional information: Just my annual check up but that is not listed on your check list

## 2025-08-26 ENCOUNTER — OFFICE VISIT (OUTPATIENT)
Dept: SLEEP MEDICINE | Facility: HOSPITAL | Age: 60
End: 2025-08-26
Payer: COMMERCIAL

## 2025-08-26 VITALS
SYSTOLIC BLOOD PRESSURE: 132 MMHG | OXYGEN SATURATION: 92 % | HEART RATE: 68 BPM | BODY MASS INDEX: 36.7 KG/M2 | HEIGHT: 72 IN | WEIGHT: 271 LBS | DIASTOLIC BLOOD PRESSURE: 83 MMHG

## 2025-08-26 DIAGNOSIS — G47.33 OBSTRUCTIVE SLEEP APNEA, ADULT: Primary | ICD-10-CM

## 2025-08-26 DIAGNOSIS — E66.9 OBESITY (BMI 30-39.9): ICD-10-CM

## 2025-08-26 PROCEDURE — G0463 HOSPITAL OUTPT CLINIC VISIT: HCPCS
